# Patient Record
Sex: MALE | Race: WHITE | NOT HISPANIC OR LATINO
[De-identification: names, ages, dates, MRNs, and addresses within clinical notes are randomized per-mention and may not be internally consistent; named-entity substitution may affect disease eponyms.]

---

## 2017-03-31 ENCOUNTER — MEDICATION RENEWAL (OUTPATIENT)
Age: 57
End: 2017-03-31

## 2017-06-16 ENCOUNTER — MEDICATION RENEWAL (OUTPATIENT)
Age: 57
End: 2017-06-16

## 2017-09-08 ENCOUNTER — MEDICATION RENEWAL (OUTPATIENT)
Age: 57
End: 2017-09-08

## 2017-10-16 ENCOUNTER — OTHER (OUTPATIENT)
Age: 57
End: 2017-10-16

## 2017-10-16 DIAGNOSIS — Z23 ENCOUNTER FOR IMMUNIZATION: ICD-10-CM

## 2017-10-17 ENCOUNTER — MED ADMIN CHARGE (OUTPATIENT)
Age: 57
End: 2017-10-17

## 2017-10-17 ENCOUNTER — APPOINTMENT (OUTPATIENT)
Dept: INTERNAL MEDICINE | Facility: CLINIC | Age: 57
End: 2017-10-17
Payer: COMMERCIAL

## 2017-10-17 PROCEDURE — 90686 IIV4 VACC NO PRSV 0.5 ML IM: CPT

## 2017-10-17 PROCEDURE — G0008: CPT

## 2018-01-12 ENCOUNTER — MEDICATION RENEWAL (OUTPATIENT)
Age: 58
End: 2018-01-12

## 2018-02-08 ENCOUNTER — OTHER (OUTPATIENT)
Age: 58
End: 2018-02-08

## 2018-02-08 DIAGNOSIS — N39.0 URINARY TRACT INFECTION, SITE NOT SPECIFIED: ICD-10-CM

## 2018-02-08 DIAGNOSIS — R06.02 SHORTNESS OF BREATH: ICD-10-CM

## 2018-02-12 ENCOUNTER — MEDICATION RENEWAL (OUTPATIENT)
Age: 58
End: 2018-02-12

## 2018-02-12 RX ORDER — CEFUROXIME AXETIL 500 MG/1
500 TABLET ORAL
Qty: 20 | Refills: 0 | Status: COMPLETED | COMMUNITY
Start: 2018-02-12 | End: 2018-02-22

## 2018-02-12 RX ORDER — AZITHROMYCIN 250 MG/1
250 TABLET, FILM COATED ORAL
Qty: 1 | Refills: 0 | Status: COMPLETED | COMMUNITY
Start: 2018-02-08 | End: 2018-02-12

## 2018-04-12 ENCOUNTER — MEDICATION RENEWAL (OUTPATIENT)
Age: 58
End: 2018-04-12

## 2018-05-17 ENCOUNTER — RX RENEWAL (OUTPATIENT)
Age: 58
End: 2018-05-17

## 2018-07-31 ENCOUNTER — MEDICATION RENEWAL (OUTPATIENT)
Age: 58
End: 2018-07-31

## 2018-09-18 ENCOUNTER — MEDICATION RENEWAL (OUTPATIENT)
Age: 58
End: 2018-09-18

## 2018-11-05 ENCOUNTER — RX RENEWAL (OUTPATIENT)
Age: 58
End: 2018-11-05

## 2018-12-21 ENCOUNTER — RX RENEWAL (OUTPATIENT)
Age: 58
End: 2018-12-21

## 2019-06-06 ENCOUNTER — RX RENEWAL (OUTPATIENT)
Age: 59
End: 2019-06-06

## 2019-07-29 ENCOUNTER — APPOINTMENT (OUTPATIENT)
Dept: DERMATOLOGY | Facility: CLINIC | Age: 59
End: 2019-07-29
Payer: COMMERCIAL

## 2019-07-29 PROCEDURE — 99203 OFFICE O/P NEW LOW 30 MIN: CPT

## 2019-07-29 RX ORDER — PREDNISONE 20 MG/1
20 TABLET ORAL DAILY
Qty: 18 | Refills: 1 | Status: DISCONTINUED | COMMUNITY
Start: 2018-02-08 | End: 2019-07-29

## 2019-07-29 NOTE — ASSESSMENT
[FreeTextEntry1] : Seb derm\par DHS-Zinc vs. Tar shampoo's.\par Locoid solution for scalp prn.\par Hytone cream for facial rash prn.

## 2019-07-29 NOTE — PHYSICAL EXAM
[Oriented x 3] : ~L oriented x 3 [Alert] : alert [Well Nourished] : well nourished [Lips] : Lips [Ears] : Ears [FreeTextEntry3] : Erythema and scale - bilateral malar region, left medial canthal region, pre- and posterior auricular regions.  Scale of the posterior scalp. [Neck] : Neck

## 2019-07-29 NOTE — HISTORY OF PRESENT ILLNESS
[FreeTextEntry1] : Fit in for rash. [de-identified] : Centrofacial region, exacerbating/remitting over the past weeks or longer.  Irritated with lotions.  Marked flaking noted.

## 2019-10-28 ENCOUNTER — RX RENEWAL (OUTPATIENT)
Age: 59
End: 2019-10-28

## 2019-11-13 ENCOUNTER — APPOINTMENT (OUTPATIENT)
Dept: ORTHOPEDIC SURGERY | Facility: CLINIC | Age: 59
End: 2019-11-13
Payer: COMMERCIAL

## 2019-11-13 VITALS
BODY MASS INDEX: 30.46 KG/M2 | SYSTOLIC BLOOD PRESSURE: 142 MMHG | HEART RATE: 80 BPM | DIASTOLIC BLOOD PRESSURE: 84 MMHG | HEIGHT: 75 IN | WEIGHT: 245 LBS

## 2019-11-13 DIAGNOSIS — M79.671 PAIN IN RIGHT FOOT: ICD-10-CM

## 2019-11-13 PROCEDURE — 97760 ORTHOTIC MGMT&TRAING 1ST ENC: CPT

## 2019-11-13 PROCEDURE — 73630 X-RAY EXAM OF FOOT: CPT | Mod: RT

## 2019-11-13 PROCEDURE — 99204 OFFICE O/P NEW MOD 45 MIN: CPT | Mod: 25

## 2019-11-13 NOTE — CONSULT LETTER
[Consult Letter:] : I had the pleasure of evaluating your patient, [unfilled]. [Please see my note below.] : Please see my note below. [Sincerely,] : Sincerely, [Consult Closing:] : Thank you very much for allowing me to participate in the care of this patient.  If you have any questions, please do not hesitate to contact me. [FreeTextEntry3] : Vj Campuzano, DO\par Foot and Ankle Surgery\par

## 2019-11-13 NOTE — HISTORY OF PRESENT ILLNESS
[FreeTextEntry1] : 59 year old male presenting with right foot pain. The patient’s pain is noted to be a 7/10. The patient's pain began on 11/4/19. The patient cannot attribute their pain to any specific injury, fall, or trauma. The patient describes their pain as constant, throbbing, and sharp. The pain is made worse by walking. He is currently taking NSAIDs and Tylenol. No other complaints at this time.\par \par \par

## 2019-11-13 NOTE — DISCUSSION/SUMMARY
[de-identified] : Today I had a lengthy discussion with the patient regarding their right foot pain.I have addressed all the patient's concerns surrounding the pathology of their condition. I recommend that the patient utilize Voltaren gel topically. A prescription for the Voltaren gel was ordered for the patient today. If the Voltaren gel could not be obtained, Icy Hot, Biofreeze, or Bengay can be utilized instead. I also advised the patient to utilize ice and heat.  I recommend that the patient utilize a CAM boot. The patient was provided with a CAM boot in the office today. At this time I would like to obtain advanced imaging of the patient's right foot and right ankle. An MRI was ordered so I can find out more about the etiology of the patient's condition. The patient should follow up with the office after obtaining the MRIs. The patient understood and verbally agreed to the treatment plan. All of their questions were answered and they were satisfied with the visit. The patient should call the office if they have any questions or experience worsening symptoms.\par \par \par

## 2019-11-13 NOTE — PHYSICAL EXAM
[de-identified] : General: Alert and oriented x3. In no acute distress. Pleasant in nature with a normal affect. No apparent respiratory distress.\par \par R Foot Exam\par Skin: Clean, dry, intact\par Inspection: No obvious malalignment, no masses, no swelling, no effusion\par Pulses: 2+ DP/PT pulses\par ROM: FOOT Full ROM of digits, ANKLE 10 degrees of dorsiflexion, 40 degrees of plantarflexion, 10 degrees of subtalar motion.\par Painful ROM: None\par Tenderness: +plantar medial soft tissues. No pain on MT. No tenderness over the medial malleolus, No tenderness over the lateral malleolus, no CFL/ATFL/PTFL pain, no deltoid ligament pain. No heel pain. No Achilles tenderness. No 5th metatarsal pain. No pain to the LisFranc joint. No ttp over the posterior tibial tendon.\par Stability: Negative anterior/posterior drawer.\par Strength: 5/5 ADD/ABD/TA/GS/EHL/FHL/EDL\par Neuro: Sensation in tact to light touch throughout\par Additional tests: Negative Mortons test, negative tarsal tunnel tinels, negative single heel rise. \par \par BL Standing Exam\par +metatarsis adductus [de-identified] : 3V of the right foot were ordered obtained and reviewed by me today, 11/13/2019 , revealed: Metatarsis adductus, no fracture seen. \par \par \par

## 2019-11-13 NOTE — ADDENDUM
[FreeTextEntry1] : I, Ervin Hanh, acted solely as a scribe for Dr. Vj Campuzano on this date 11/13/2019 .\par All medical record entries made by the Scribe were at my, Dr. Vj Campuzano, direction and personally dictated by me on 11/13/2019 . I have reviewed the chart and agree that the record accurately reflects my personal performance of the history, physical exam, assessment and plan. I have also personally directed, reviewed, and agreed with the chart.\par \par \par

## 2019-11-14 DIAGNOSIS — M25.571 PAIN IN RIGHT ANKLE AND JOINTS OF RIGHT FOOT: ICD-10-CM

## 2019-11-15 ENCOUNTER — FORM ENCOUNTER (OUTPATIENT)
Age: 59
End: 2019-11-15

## 2019-11-16 ENCOUNTER — APPOINTMENT (OUTPATIENT)
Dept: MRI IMAGING | Facility: CLINIC | Age: 59
End: 2019-11-16
Payer: COMMERCIAL

## 2019-11-16 ENCOUNTER — OUTPATIENT (OUTPATIENT)
Dept: OUTPATIENT SERVICES | Facility: HOSPITAL | Age: 59
LOS: 1 days | End: 2019-11-16
Payer: COMMERCIAL

## 2019-11-16 DIAGNOSIS — M79.671 PAIN IN RIGHT FOOT: ICD-10-CM

## 2019-11-16 DIAGNOSIS — M25.571 PAIN IN RIGHT ANKLE AND JOINTS OF RIGHT FOOT: ICD-10-CM

## 2019-11-16 PROCEDURE — 73721 MRI JNT OF LWR EXTRE W/O DYE: CPT

## 2019-11-16 PROCEDURE — 73718 MRI LOWER EXTREMITY W/O DYE: CPT | Mod: 26,RT

## 2019-11-16 PROCEDURE — 73718 MRI LOWER EXTREMITY W/O DYE: CPT

## 2019-11-16 PROCEDURE — 73721 MRI JNT OF LWR EXTRE W/O DYE: CPT | Mod: 26,RT

## 2019-11-18 DIAGNOSIS — R68.82 DECREASED LIBIDO: ICD-10-CM

## 2020-01-07 PROBLEM — R68.82 DECREASED SEXUAL RESPONSE: Status: ACTIVE | Noted: 2020-01-07

## 2020-02-10 ENCOUNTER — MED ADMIN CHARGE (OUTPATIENT)
Age: 60
End: 2020-02-10

## 2020-04-09 DIAGNOSIS — M54.2 CERVICALGIA: ICD-10-CM

## 2020-04-26 ENCOUNTER — MESSAGE (OUTPATIENT)
Age: 60
End: 2020-04-26

## 2020-05-05 LAB
SARS-COV-2 IGG SERPL IA-ACNC: <0.1 INDEX
SARS-COV-2 IGG SERPL QL IA: NEGATIVE

## 2020-08-21 RX ORDER — HYDROCORTISONE BUTYRATE 1 MG/ML
0.1 SOLUTION TOPICAL
Qty: 1 | Refills: 2 | Status: COMPLETED | COMMUNITY
Start: 2019-07-29 | End: 2020-08-21

## 2020-08-21 RX ORDER — HYDROCORTISONE 25 MG/G
2.5 CREAM TOPICAL TWICE DAILY
Qty: 1 | Refills: 2 | Status: COMPLETED | COMMUNITY
Start: 2019-07-29 | End: 2020-08-21

## 2020-08-21 RX ORDER — PREDNISONE 20 MG/1
20 TABLET ORAL DAILY
Qty: 25 | Refills: 1 | Status: COMPLETED | COMMUNITY
Start: 2019-11-14 | End: 2020-08-21

## 2020-08-21 RX ORDER — PREDNISONE 20 MG/1
20 TABLET ORAL TWICE DAILY
Qty: 45 | Refills: 0 | Status: COMPLETED | COMMUNITY
Start: 2020-04-09 | End: 2020-08-21

## 2020-08-21 RX ORDER — ZONISAMIDE 100 MG/1
100 CAPSULE ORAL
Qty: 60 | Refills: 3 | Status: COMPLETED | COMMUNITY
Start: 2018-01-09 | End: 2020-08-21

## 2020-09-02 ENCOUNTER — EMERGENCY (EMERGENCY)
Facility: HOSPITAL | Age: 60
LOS: 0 days | Discharge: HOME | End: 2020-09-02
Payer: COMMERCIAL

## 2020-09-02 VITALS
HEART RATE: 57 BPM | SYSTOLIC BLOOD PRESSURE: 122 MMHG | TEMPERATURE: 98 F | OXYGEN SATURATION: 97 % | RESPIRATION RATE: 18 BRPM | DIASTOLIC BLOOD PRESSURE: 85 MMHG

## 2020-09-02 DIAGNOSIS — Z11.59 ENCOUNTER FOR SCREENING FOR OTHER VIRAL DISEASES: ICD-10-CM

## 2020-09-02 DIAGNOSIS — Z03.818 ENCOUNTER FOR OBSERVATION FOR SUSPECTED EXPOSURE TO OTHER BIOLOGICAL AGENTS RULED OUT: ICD-10-CM

## 2020-09-02 PROCEDURE — U0003: CPT

## 2020-09-02 PROCEDURE — 99283 EMERGENCY DEPT VISIT LOW MDM: CPT

## 2020-09-02 NOTE — ED STATDOCS - PATIENT PORTAL LINK FT
You can access the FollowMyHealth Patient Portal offered by [facility] by registering at the following website: [email or web address]. By joining [employer]’s FollowMyHealth portal, you will also be able to view your health information using other applications (apps) compatible with our system.

## 2020-09-02 NOTE — ED STATDOCS - PHYSICAL EXAMINATION
Constitutional: NAD AAOx3. Nontoxic, well appearing. Speaking full sentences  w/o distress  Eyes: EOMI, pupils equal  Head: Normocephalic atraumatic  Mouth: no airway obstruction  Cardiac: [id number]   Resp: Lungs CTAB  GI: Abd s/nt/nd  Neuro: motor and sensory intact

## 2020-09-03 LAB — SARS-COV-2 RNA SPEC QL NAA+PROBE: SIGNIFICANT CHANGE UP

## 2020-12-16 PROBLEM — N39.0 ACUTE UPPER URINARY TRACT INFECTION: Status: RESOLVED | Noted: 2018-02-08 | Resolved: 2020-12-16

## 2021-02-24 ENCOUNTER — OUTPATIENT (OUTPATIENT)
Dept: OUTPATIENT SERVICES | Facility: HOSPITAL | Age: 61
LOS: 1 days | End: 2021-02-24
Payer: COMMERCIAL

## 2021-02-24 DIAGNOSIS — Z20.828 CONTACT WITH AND (SUSPECTED) EXPOSURE TO OTHER VIRAL COMMUNICABLE DISEASES: ICD-10-CM

## 2021-02-24 LAB — SARS-COV-2 RNA SPEC QL NAA+PROBE: SIGNIFICANT CHANGE UP

## 2021-02-24 PROCEDURE — U0005: CPT

## 2021-02-24 PROCEDURE — C9803: CPT

## 2021-02-24 PROCEDURE — U0003: CPT

## 2021-02-25 DIAGNOSIS — Z20.828 CONTACT WITH AND (SUSPECTED) EXPOSURE TO OTHER VIRAL COMMUNICABLE DISEASES: ICD-10-CM

## 2021-04-23 RX ORDER — DICLOFENAC SODIUM 10 MG/G
1 GEL TOPICAL
Qty: 1 | Refills: 2 | Status: COMPLETED | COMMUNITY
Start: 2019-11-13 | End: 2021-04-23

## 2021-10-08 RX ORDER — ALPRAZOLAM 1 MG/1
1 TABLET ORAL
Qty: 90 | Refills: 0 | Status: ACTIVE | COMMUNITY
Start: 2017-03-31 | End: 1900-01-01

## 2021-10-16 ENCOUNTER — NON-APPOINTMENT (OUTPATIENT)
Age: 61
End: 2021-10-16

## 2021-10-20 ENCOUNTER — NON-APPOINTMENT (OUTPATIENT)
Age: 61
End: 2021-10-20

## 2021-10-20 DIAGNOSIS — R07.9 CHEST PAIN, UNSPECIFIED: ICD-10-CM

## 2021-10-21 LAB
25(OH)D3 SERPL-MCNC: 14.7 NG/ML
ALBUMIN SERPL ELPH-MCNC: 4.8 G/DL
ALP BLD-CCNC: 80 U/L
ALT SERPL-CCNC: 30 U/L
ANION GAP SERPL CALC-SCNC: 15 MMOL/L
APPEARANCE: ABNORMAL
AST SERPL-CCNC: 23 U/L
BACTERIA: NEGATIVE
BASOPHILS # BLD AUTO: 0.03 K/UL
BASOPHILS NFR BLD AUTO: 0.3 %
BILIRUB SERPL-MCNC: 0.8 MG/DL
BILIRUBIN URINE: NEGATIVE
BLOOD URINE: NEGATIVE
BUN SERPL-MCNC: 15 MG/DL
CALCIUM SERPL-MCNC: 9.8 MG/DL
CHLORIDE SERPL-SCNC: 106 MMOL/L
CHOLEST SERPL-MCNC: 98 MG/DL
CO2 SERPL-SCNC: 23 MMOL/L
COLOR: ABNORMAL
CREAT SERPL-MCNC: 0.8 MG/DL
EOSINOPHIL # BLD AUTO: 0.12 K/UL
EOSINOPHIL NFR BLD AUTO: 1.2 %
ESTIMATED AVERAGE GLUCOSE: 100 MG/DL
GLUCOSE QUALITATIVE U: NEGATIVE
GLUCOSE SERPL-MCNC: 140 MG/DL
HBA1C MFR BLD HPLC: 5.1 %
HCT VFR BLD CALC: 49.6 %
HDLC SERPL-MCNC: 39 MG/DL
HGB BLD-MCNC: 16.7 G/DL
HYALINE CASTS: 2 /LPF
IMM GRANULOCYTES NFR BLD AUTO: 0.3 %
KETONES URINE: NORMAL
LDLC SERPL CALC-MCNC: 36 MG/DL
LEUKOCYTE ESTERASE URINE: NEGATIVE
LYMPHOCYTES # BLD AUTO: 1.82 K/UL
LYMPHOCYTES NFR BLD AUTO: 18.6 %
MAN DIFF?: NORMAL
MCHC RBC-ENTMCNC: 31.9 PG
MCHC RBC-ENTMCNC: 33.7 GM/DL
MCV RBC AUTO: 94.7 FL
MICROSCOPIC-UA: NORMAL
MONOCYTES # BLD AUTO: 0.49 K/UL
MONOCYTES NFR BLD AUTO: 5 %
NEUTROPHILS # BLD AUTO: 7.28 K/UL
NEUTROPHILS NFR BLD AUTO: 74.6 %
NITRITE URINE: NEGATIVE
NONHDLC SERPL-MCNC: 59 MG/DL
PH URINE: 6
PLATELET # BLD AUTO: 256 K/UL
POTASSIUM SERPL-SCNC: 3.6 MMOL/L
PROT SERPL-MCNC: 7.5 G/DL
PROTEIN URINE: ABNORMAL
PSA SERPL-MCNC: 1.27 NG/ML
RBC # BLD: 5.24 M/UL
RBC # FLD: 12.9 %
RED BLOOD CELLS URINE: 3 /HPF
SODIUM SERPL-SCNC: 144 MMOL/L
SPECIFIC GRAVITY URINE: 1.04
SQUAMOUS EPITHELIAL CELLS: 0 /HPF
T3FREE SERPL-MCNC: 3.2 PG/ML
T4 FREE SERPL-MCNC: 1 NG/DL
TRIGL SERPL-MCNC: 115 MG/DL
TSH SERPL-ACNC: 1.21 UIU/ML
UROBILINOGEN URINE: ABNORMAL
WBC # FLD AUTO: 9.77 K/UL
WHITE BLOOD CELLS URINE: 1 /HPF

## 2021-10-22 ENCOUNTER — TRANSCRIPTION ENCOUNTER (OUTPATIENT)
Age: 61
End: 2021-10-22

## 2021-10-22 ENCOUNTER — INPATIENT (INPATIENT)
Facility: HOSPITAL | Age: 61
LOS: 0 days | Discharge: HOME | DRG: 251 | End: 2021-10-22
Attending: FAMILY MEDICINE | Admitting: INTERNAL MEDICINE
Payer: COMMERCIAL

## 2021-10-22 VITALS
RESPIRATION RATE: 19 BRPM | HEART RATE: 54 BPM | WEIGHT: 255.96 LBS | HEIGHT: 75 IN | DIASTOLIC BLOOD PRESSURE: 74 MMHG | SYSTOLIC BLOOD PRESSURE: 133 MMHG | OXYGEN SATURATION: 98 %

## 2021-10-22 VITALS
SYSTOLIC BLOOD PRESSURE: 118 MMHG | OXYGEN SATURATION: 100 % | DIASTOLIC BLOOD PRESSURE: 64 MMHG | HEART RATE: 62 BPM | RESPIRATION RATE: 16 BRPM

## 2021-10-22 DIAGNOSIS — Z86.59 PERSONAL HISTORY OF OTHER MENTAL AND BEHAVIORAL DISORDERS: ICD-10-CM

## 2021-10-22 DIAGNOSIS — I10 ESSENTIAL (PRIMARY) HYPERTENSION: ICD-10-CM

## 2021-10-22 DIAGNOSIS — R07.9 CHEST PAIN, UNSPECIFIED: ICD-10-CM

## 2021-10-22 DIAGNOSIS — Z95.5 PRESENCE OF CORONARY ANGIOPLASTY IMPLANT AND GRAFT: Chronic | ICD-10-CM

## 2021-10-22 DIAGNOSIS — I25.10 ATHEROSCLEROTIC HEART DISEASE OF NATIVE CORONARY ARTERY WITHOUT ANGINA PECTORIS: ICD-10-CM

## 2021-10-22 LAB
ALBUMIN SERPL ELPH-MCNC: 3.8 G/DL — SIGNIFICANT CHANGE UP (ref 3.3–5)
ALP SERPL-CCNC: 72 U/L — SIGNIFICANT CHANGE UP (ref 40–120)
ALT FLD-CCNC: 38 U/L — SIGNIFICANT CHANGE UP (ref 12–78)
ANION GAP SERPL CALC-SCNC: 7 MMOL/L — SIGNIFICANT CHANGE UP (ref 5–17)
APTT BLD: 30.9 SEC — SIGNIFICANT CHANGE UP (ref 27.5–35.5)
AST SERPL-CCNC: 22 U/L — SIGNIFICANT CHANGE UP (ref 15–37)
BASOPHILS # BLD AUTO: 0.03 K/UL — SIGNIFICANT CHANGE UP (ref 0–0.2)
BASOPHILS NFR BLD AUTO: 0.4 % — SIGNIFICANT CHANGE UP (ref 0–2)
BILIRUB SERPL-MCNC: 1 MG/DL — SIGNIFICANT CHANGE UP (ref 0.2–1.2)
BUN SERPL-MCNC: 14 MG/DL — SIGNIFICANT CHANGE UP (ref 7–23)
CALCIUM SERPL-MCNC: 8.8 MG/DL — SIGNIFICANT CHANGE UP (ref 8.5–10.1)
CHLORIDE SERPL-SCNC: 109 MMOL/L — HIGH (ref 96–108)
CO2 SERPL-SCNC: 25 MMOL/L — SIGNIFICANT CHANGE UP (ref 22–31)
CREAT SERPL-MCNC: 0.76 MG/DL — SIGNIFICANT CHANGE UP (ref 0.5–1.3)
EOSINOPHIL # BLD AUTO: 0.08 K/UL — SIGNIFICANT CHANGE UP (ref 0–0.5)
EOSINOPHIL NFR BLD AUTO: 1.2 % — SIGNIFICANT CHANGE UP (ref 0–6)
GLUCOSE SERPL-MCNC: 92 MG/DL — SIGNIFICANT CHANGE UP (ref 70–99)
HCT VFR BLD CALC: 44.3 % — SIGNIFICANT CHANGE UP (ref 39–50)
HGB BLD-MCNC: 15.5 G/DL — SIGNIFICANT CHANGE UP (ref 13–17)
IMM GRANULOCYTES NFR BLD AUTO: 0.1 % — SIGNIFICANT CHANGE UP (ref 0–1.5)
INR BLD: 1.15 RATIO — SIGNIFICANT CHANGE UP (ref 0.88–1.16)
LIDOCAIN IGE QN: 121 U/L — SIGNIFICANT CHANGE UP (ref 73–393)
LYMPHOCYTES # BLD AUTO: 2.28 K/UL — SIGNIFICANT CHANGE UP (ref 1–3.3)
LYMPHOCYTES # BLD AUTO: 33.8 % — SIGNIFICANT CHANGE UP (ref 13–44)
MAGNESIUM SERPL-MCNC: 2.3 MG/DL — SIGNIFICANT CHANGE UP (ref 1.6–2.6)
MCHC RBC-ENTMCNC: 32.4 PG — SIGNIFICANT CHANGE UP (ref 27–34)
MCHC RBC-ENTMCNC: 35 GM/DL — SIGNIFICANT CHANGE UP (ref 32–36)
MCV RBC AUTO: 92.5 FL — SIGNIFICANT CHANGE UP (ref 80–100)
MONOCYTES # BLD AUTO: 0.42 K/UL — SIGNIFICANT CHANGE UP (ref 0–0.9)
MONOCYTES NFR BLD AUTO: 6.2 % — SIGNIFICANT CHANGE UP (ref 2–14)
NEUTROPHILS # BLD AUTO: 3.92 K/UL — SIGNIFICANT CHANGE UP (ref 1.8–7.4)
NEUTROPHILS NFR BLD AUTO: 58.3 % — SIGNIFICANT CHANGE UP (ref 43–77)
NT-PROBNP SERPL-SCNC: 30 PG/ML — SIGNIFICANT CHANGE UP (ref 0–125)
PLATELET # BLD AUTO: 204 K/UL — SIGNIFICANT CHANGE UP (ref 150–400)
POTASSIUM SERPL-MCNC: 3.5 MMOL/L — SIGNIFICANT CHANGE UP (ref 3.5–5.3)
POTASSIUM SERPL-SCNC: 3.5 MMOL/L — SIGNIFICANT CHANGE UP (ref 3.5–5.3)
PROT SERPL-MCNC: 7 GM/DL — SIGNIFICANT CHANGE UP (ref 6–8.3)
PROTHROM AB SERPL-ACNC: 13.2 SEC — SIGNIFICANT CHANGE UP (ref 10.6–13.6)
RBC # BLD: 4.79 M/UL — SIGNIFICANT CHANGE UP (ref 4.2–5.8)
RBC # FLD: 12.7 % — SIGNIFICANT CHANGE UP (ref 10.3–14.5)
SARS-COV-2 RNA SPEC QL NAA+PROBE: SIGNIFICANT CHANGE UP
SODIUM SERPL-SCNC: 141 MMOL/L — SIGNIFICANT CHANGE UP (ref 135–145)
TROPONIN I, HIGH SENSITIVITY RESULT: 5.33 NG/L — SIGNIFICANT CHANGE UP
WBC # BLD: 6.74 K/UL — SIGNIFICANT CHANGE UP (ref 3.8–10.5)
WBC # FLD AUTO: 6.74 K/UL — SIGNIFICANT CHANGE UP (ref 3.8–10.5)

## 2021-10-22 PROCEDURE — 99285 EMERGENCY DEPT VISIT HI MDM: CPT

## 2021-10-22 PROCEDURE — 86900 BLOOD TYPING SEROLOGIC ABO: CPT

## 2021-10-22 PROCEDURE — 71045 X-RAY EXAM CHEST 1 VIEW: CPT | Mod: 26

## 2021-10-22 PROCEDURE — 86850 RBC ANTIBODY SCREEN: CPT

## 2021-10-22 PROCEDURE — 93010 ELECTROCARDIOGRAM REPORT: CPT | Mod: 76

## 2021-10-22 PROCEDURE — 36415 COLL VENOUS BLD VENIPUNCTURE: CPT

## 2021-10-22 PROCEDURE — 86901 BLOOD TYPING SEROLOGIC RH(D): CPT

## 2021-10-22 PROCEDURE — 99236 HOSP IP/OBS SAME DATE HI 85: CPT

## 2021-10-22 PROCEDURE — 93005 ELECTROCARDIOGRAM TRACING: CPT

## 2021-10-22 RX ORDER — SODIUM CHLORIDE 9 MG/ML
300 INJECTION INTRAMUSCULAR; INTRAVENOUS; SUBCUTANEOUS ONCE
Refills: 0 | Status: DISCONTINUED | OUTPATIENT
Start: 2021-10-22 | End: 2021-10-22

## 2021-10-22 RX ORDER — ESCITALOPRAM OXALATE 10 MG/1
20 TABLET, FILM COATED ORAL DAILY
Refills: 0 | Status: DISCONTINUED | OUTPATIENT
Start: 2021-10-22 | End: 2021-10-22

## 2021-10-22 RX ORDER — LISINOPRIL 2.5 MG/1
10 TABLET ORAL DAILY
Refills: 0 | Status: DISCONTINUED | OUTPATIENT
Start: 2021-10-22 | End: 2021-10-22

## 2021-10-22 RX ORDER — ASPIRIN/CALCIUM CARB/MAGNESIUM 324 MG
1 TABLET ORAL
Qty: 0 | Refills: 0 | DISCHARGE

## 2021-10-22 RX ORDER — NIACIN 50 MG
750 TABLET ORAL AT BEDTIME
Refills: 0 | Status: DISCONTINUED | OUTPATIENT
Start: 2021-10-22 | End: 2021-10-22

## 2021-10-22 RX ORDER — ASPIRIN/CALCIUM CARB/MAGNESIUM 324 MG
1 TABLET ORAL
Qty: 0 | Refills: 0 | DISCHARGE
Start: 2021-10-22

## 2021-10-22 RX ORDER — ONDANSETRON 8 MG/1
4 TABLET, FILM COATED ORAL EVERY 8 HOURS
Refills: 0 | Status: DISCONTINUED | OUTPATIENT
Start: 2021-10-22 | End: 2021-10-22

## 2021-10-22 RX ORDER — CLOPIDOGREL BISULFATE 75 MG/1
75 TABLET, FILM COATED ORAL DAILY
Refills: 0 | Status: DISCONTINUED | OUTPATIENT
Start: 2021-10-23 | End: 2021-10-22

## 2021-10-22 RX ORDER — LANOLIN ALCOHOL/MO/W.PET/CERES
3 CREAM (GRAM) TOPICAL AT BEDTIME
Refills: 0 | Status: DISCONTINUED | OUTPATIENT
Start: 2021-10-22 | End: 2021-10-22

## 2021-10-22 RX ORDER — METOPROLOL TARTRATE 50 MG
50 TABLET ORAL DAILY
Refills: 0 | Status: DISCONTINUED | OUTPATIENT
Start: 2021-10-22 | End: 2021-10-22

## 2021-10-22 RX ORDER — ACETAMINOPHEN 500 MG
650 TABLET ORAL EVERY 6 HOURS
Refills: 0 | Status: DISCONTINUED | OUTPATIENT
Start: 2021-10-22 | End: 2021-10-22

## 2021-10-22 RX ORDER — ATORVASTATIN CALCIUM 80 MG/1
80 TABLET, FILM COATED ORAL AT BEDTIME
Refills: 0 | Status: DISCONTINUED | OUTPATIENT
Start: 2021-10-22 | End: 2021-10-22

## 2021-10-22 RX ORDER — ASPIRIN/CALCIUM CARB/MAGNESIUM 324 MG
325 TABLET ORAL DAILY
Refills: 0 | Status: DISCONTINUED | OUTPATIENT
Start: 2021-10-22 | End: 2021-10-22

## 2021-10-22 RX ORDER — DEXTROSE MONOHYDRATE, SODIUM CHLORIDE, AND POTASSIUM CHLORIDE 50; .745; 4.5 G/1000ML; G/1000ML; G/1000ML
1000 INJECTION, SOLUTION INTRAVENOUS
Refills: 0 | Status: DISCONTINUED | OUTPATIENT
Start: 2021-10-22 | End: 2021-10-22

## 2021-10-22 NOTE — ED PROVIDER NOTE - NSICDXPASTMEDICALHX_GEN_ALL_CORE_FT
PAST MEDICAL HISTORY:  CAD (coronary artery disease)     H/O: depression     HTN (hypertension)

## 2021-10-22 NOTE — H&P ADULT - NSHPLABSRESULTS_GEN_ALL_CORE
· EKG Date/Time: 22-Oct-2021 05:25  · Rate: 58  · Interpretation: abnormal  · Abnormal Rhythm: sinus bradycardia  · Axis: Normal  · Prior EKG Status: the EKG is unchanged from prior EKG  May 2013    10/22/21  CXR-NAD     LABS: All Labs Reviewed:                        15.5   6.74  )-----------( 204      ( 22 Oct 2021 05:49 )             44.3     10-22    141  |  109<H>  |  14  ----------------------------<  92  3.5   |  25  |  0.76    Ca    8.8      22 Oct 2021 05:49  Mg     2.3     10-22    TPro  7.0  /  Alb  3.8  /  TBili  1.0  /  DBili  x   /  AST  22  /  ALT  38  /  AlkPhos  72  10-22    PT/INR - ( 22 Oct 2021 05:49 )   PT: 13.2 sec;   INR: 1.15 ratio         PTT - ( 22 Oct 2021 05:49 )  PTT:30.9 sec  Troponin I, High Sensitivity (10.22.21 @ 05:49)    Troponin I, High Sensitivity Result: 5.33:     COVID-19 PCR: NotDetec (22 Oct 2021 05:49)

## 2021-10-22 NOTE — DISCHARGE NOTE PROVIDER - NSDCCPCAREPLAN_GEN_ALL_CORE_FT
PRINCIPAL DISCHARGE DIAGNOSIS  Diagnosis: Chest pain  Assessment and Plan of Treatment: s/p [facility]   Balloon angiplasty to LAD for instent restenosis   Continue all medications including ASA, Plavix  DO NOT STOP taking these medicines, they keep your stent open without first asking your Cardiologist.   Follow up with Dr. Chavis in 1-2 week      You may shower, no baths/swimming x one week. No heavy lifting greater than 5-10 pounds with right wrist x one week. No strenuous activity x 3 weeks. Monitor site of procedure and notify your doctor for any redness, swelling, discharge  Bandage: Keep bandage clean and dry. Remove after 24 hours.   Special Instructions: procedure was done in your wrist, avoid reaching or placing too much pressure on that arm or wrist for 48 hours.  If you have bleeding from the procedure site: Lie down and remove the bandage. Apply hard pressure and call your doctor. If bleeding and swelling cannot be controlled, call 911.   Call your doctor immediately if: 1) You have severe pain, swelling, or bruising at the procedure site. A small amount or soreness and bruising (black and blue) is normal. 2) Procedure site becomes very red or feels hot to touch. 3) Your hand becomes blue or feels cold to touch. 4)You feel sudden back or belly pain. 5)You develop fever      SECONDARY DISCHARGE DIAGNOSES  Diagnosis: HTN (hypertension)  Assessment and Plan of Treatment: Continue current blood pressure medication regimen as directed. Monitor for any visual changes, headaches or dizziness.  Monitor blood pressure regularly.  Follow up with your PCP for further management for high blood pressure, please call to make appointment within 1 week of discharge    Diagnosis: H/O: depression  Assessment and Plan of Treatment: Continue medications. Follow up with your PCP for further evaluation and management. Please call to make an appointment within 1-2 weeks of discharge.    Diagnosis: CAD (coronary artery disease)  Assessment and Plan of Treatment: Please continue your medications as directed and follow-up with your primary provider/cardiologist to further manage your care. Monitor for signs/symptoms of uncontrolled atrial fibrillation, such as, increased heart rate, palpitations, chest pain, dizziness, or shortness of breath - Return to emergency room if these signs/symptoms are present.

## 2021-10-22 NOTE — DISCHARGE NOTE PROVIDER - NSDCCPTREATMENT_GEN_ALL_CORE_FT
PRINCIPAL PROCEDURE  Procedure: Left heart catheterization  Findings and Treatment: s/p POBA to LAD ISR

## 2021-10-22 NOTE — H&P ADULT - HISTORY OF PRESENT ILLNESS
60 yo M with PMHx of CAD,  MI at 48 multiple coronary stents, HTN, depression presents with chest pain.  Chest tigtngess a/w SOB/HENDRIX x 2 days. Patient reporting outpatient cardiac up completed, with inconclusive stress test. Patient deferred elective LHC as outpatient. Pain occurred at rest this morning, was in center of chest and is described as squeezing pain for about 10-15 minutes, concerning patient so he came to ED.  Given outpatient work up in office with Dr. Chavis, plan for LHC with Dr. Hensley today.

## 2021-10-22 NOTE — ED ADULT NURSE NOTE - OBJECTIVE STATEMENT
Pt presents to ED for chest pain that lasted 10 minutes that started 1 hour PTA. Pt states that he was also sweating during that episode. Pt states that he spoke to cardiologist Dr. Chavis and was advised to come to the ED. Pt has a history of an MI and 12 stents. pt states he also had chest pain 2 days ago that felt similar to that episode of chest pain earlier today. Pt denies chest pain, abdominal pain, LOC, dizziness, nausea/vomiting at this time.

## 2021-10-22 NOTE — DISCHARGE NOTE PROVIDER - CARE PROVIDER_API CALL
Ezequiel Shields)  Internal Medicine; Pulmonary Disease  [address], [address]  [place] [address]  Phone: [phone]  Fax: [phone]  Established Patient  Follow Up Time: 2 weeks    Demetrius Chavis)  Cardiovascular Disease; Nuclear Cardiology  [address] [place] [address]  Phone: [phone]  Fax: [phone]  Established Patient  Follow Up Time: 1 week    Chet Camara)  Cardiovascular Disease; Interventional Cardiology  [address] [place] [address]  Phone: [phone]  Fax: [phone]  Follow Up Time: 2 weeks

## 2021-10-22 NOTE — ED PROVIDER NOTE - CLINICAL SUMMARY MEDICAL DECISION MAKING FREE TEXT BOX
Hx of CAD with stents. EKG unchanged.  Cardiac labs to be sent.  Patient moderate to high risk.  Will consult Dr. Chavis.

## 2021-10-22 NOTE — PROGRESS NOTE ADULT - SUBJECTIVE AND OBJECTIVE BOX
Cath Lab Nurse Practitioner Note  HPI:62 yo M with PMHx of CAD,  MI at 48 multiple coronary stents, HTN, depression presents with chest pain.  Chest tigtngess a/w SOB/HENDRIX x 2 days. Patient reporting outpatient cardiac up completed, with inconclusive stress test. Patient deferred elective LHC as outpatient. Pain occurred at rest this morning, was in center of chest and is described as squeezing pain for about 10-15 minutes, concerning patient so he came to ED.  Given outpatient work up in office with Dr. Chavis, plan for LHC with Dr. Hensley today.  (22 Oct 2021 08:30)    s/p LHC : POBA to LAD  Pt denies chest pain/SOB/palpitations post cath.      Vital Signs Last 24 Hrs  T(F): 98.3 HR: 60   BP: 109/71  RR: 16 SpO2: 97%       Labs:                        15.5   6.74  )-----------( 204      ( 22 Oct 2021 05:49 )             44.3     10-22    141  |  109<H>  |  14  ----------------------------<  92  3.5   |  25  |  0.76    Ca    8.8      22 Oct 2021 05:49  Mg     2.3     10-22    TPro  7.0  /  Alb  3.8  /  TBili  1.0  /  DBili  x   /  AST  22  /  ALT  38  /  AlkPhos  72  10-22    PT/INR - ( 22 Oct 2021 05:49 )   PT: 13.2 sec;   INR: 1.15 ratio         PTT - ( 22 Oct 2021 05:49 )  PTT:30.9 sec        MEDICATIONS  (STANDING):  aspirin 325 milliGRAM(s) Oral daily  atorvastatin 80 milliGRAM(s) Oral at bedtime  escitalopram 20 milliGRAM(s) Oral daily  lisinopril 10 milliGRAM(s) Oral daily  metoprolol succinate ER 50 milliGRAM(s) Oral daily  niacin  milliGRAM(s) Oral at bedtime  sodium chloride 0.45% with potassium chloride 20 mEq/L 1000 milliLiter(s) (75 mL/Hr) IV Continuous <Continuous>      PHYSICAL EXAM  GENERAL: NAD, AAOx3  CHEST/LUNG: Clear to auscultation bilaterally; No wheeze  HEART: s1 s2 Regular rate and rhythm; No murmurs, rubs, or gallops  ABDOMEN: Soft, Nontender, Nondistended; Bowel sounds present X 4 quadrants   EXTREMITIES:  2+ Peripheral Pulses, No clubbing, cyanosis, or edema  Psych: normal affect and behavior, calm and cooperative   PROCEDURE SITE: LRA accessed, hemoband to be removed at 1215pm ,Site is without hematoma or bleeding. Sensation and THAD intact. Distal pulses palpable 2+, capillary refill < 2 seconds. Patient denies pain, numbness, tingling, CP or SOB. Clean dry dressing applied     EKG post POBA- SR rate 60    PROCEDURE RESULTS full report to follow     ASSESSMENT : 62 yo M with PMHx of CAD,  MI at 48 multiple coronary stents, HTN, depression presents with chest pain.    s/p LHC revealing ISR to LAD s/p POBA will go for brachytherapy with Dr. Huston at [facility] as an outpatient    PLAN:    #CAD  - s/p POBA to LAD  -IV hydration NS @ 75mL/hr x 6 hours  -VS, lab, diet and activity per PCI post orders  -continue ASA/plavix/b-blocker/statin  -Discussed therapeutic lifestyle changes to reduce risk factors such as following a cardiac diet, weight loss, maintaining a healthy weight, exercise, smoking cessation, medication compliance, and regular follow-up  with PCP/Cardioloigst  --Post cath instructions reviewed, patient verbalizes and understands instructions  - Patient to be discharged home at 5pm, recommend following up with cardiologist in 1-2 weeks  -plan discussed with patient, RN and Dr. Hensley, and Dr. Scott

## 2021-10-22 NOTE — H&P ADULT - PROBLEM SELECTOR PLAN 1
[facility] today   - rest of plan post procedure   c/w ASA, plavix, BB, Statin

## 2021-10-22 NOTE — DISCHARGE NOTE PROVIDER - PROVIDER TOKENS
PROVIDER:[TOKEN:[[id number]],FOLLOWUP:[2 weeks],ESTABLISHEDPATIENT:[T]],PROVIDER:[TOKEN:[[id number]],FOLLOWUP:[1 week],ESTABLISHEDPATIENT:[T]],PROVIDER:[TOKEN:[[id number]],FOLLOWUP:[2 weeks]]

## 2021-10-22 NOTE — H&P ADULT - NSICDXPASTMEDICALHX_GEN_ALL_CORE_FT
PAST MEDICAL HISTORY:  CAD (coronary artery disease)     H/O: depression     HTN (hypertension)     STEMI (ST elevation myocardial infarction) at 47 y/o

## 2021-10-22 NOTE — ED PROVIDER NOTE - CARDIAC, MLM
Normal rate, regular rhythm.  Heart sounds S1, S2.  No murmurs, rubs or gallops.  No reproducible chest wall tenderness

## 2021-10-22 NOTE — PACU DISCHARGE NOTE - COMMENTS
S/p LHC via left radial artery. VS stable. Discharge instructions reviewed and patient verbalizes understanding.  Patient pending transport home at this time

## 2021-10-22 NOTE — H&P ADULT - NSHPPHYSICALEXAM_GEN_ALL_CORE
Vital Signs Last 24 Hrs  T(C): 36.8 (22 Oct 2021 07:31), Max: 36.8 (22 Oct 2021 07:26)  T(F): 98.3 (22 Oct 2021 07:31), Max: 98.3 (22 Oct 2021 07:26)  HR: 60 (22 Oct 2021 07:31) (54 - 60)  BP: 120/75 (22 Oct 2021 07:31) (103/74 - 133/74)  RR: 16 (22 Oct 2021 07:31) (16 - 19)  SpO2: 98% (22 Oct 2021 07:31) (98% - 99%)

## 2021-10-22 NOTE — ED ADULT NURSE NOTE - NSIMPLEMENTINTERV_GEN_ALL_ED
Implemented All Fall with Harm Risk Interventions:  [place] to call system. Call bell, personal items and telephone within reach. Instruct patient to call for assistance. Room bathroom lighting operational. Non-slip footwear when patient is off stretcher. Physically safe environment: no spills, clutter or unnecessary equipment. Stretcher in lowest position, wheels locked, appropriate side rails in place. Provide visual cue, wrist band, yellow gown, etc. Monitor gait and stability. Monitor for mental status changes and reorient to person, place, and time. Review medications for side effects contributing to fall risk. Reinforce activity limits and safety measures with patient and family. Provide visual clues: red socks.

## 2021-10-22 NOTE — CHART NOTE - NSCHARTNOTEFT_GEN_A_CORE
Cardiac Cath Lab Nurse Practitioner :  HPI: 60 yo M with PMHx of CAD,  MI at 48 multiple coronary stents, HTN, depression presents with chest pain.  Chest tigtngess a/w SOB/HENDRIX x 2 days. Patient reporting outpatient cardiac up completed, with inconclusive stress test. Patient deferred elective LHC as outpatient. Pain occurred at rest this morning, was in center of chest and is described as squeezing pain for about 10-15 minutes, concerning patient so he came to ED.  Given outpatient work up in office with Dr. Chavis, plan for LHC with Dr. Hensley today.       Patient seen and examined. No c/o symptoms other than identified in ROS    MEDICATIONS  (STANDING):      Vital Signs Last 24 Hrs  T(C): 36.8 (22 Oct 2021 07:31), Max: 36.8 (22 Oct 2021 07:26)  T(F): 98.3 (22 Oct 2021 07:31), Max: 98.3 (22 Oct 2021 07:26)  HR: 60 (22 Oct 2021 07:31) (54 - 60)  BP: 120/75 (22 Oct 2021 07:31) (103/74 - 133/74)  BP(mean): --  RR: 16 (22 Oct 2021 07:31) (16 - 19)  SpO2: 98% (22 Oct 2021 07:31) (98% - 99%)    LABS:                        15.5   6.74  )-----------( 204      ( 22 Oct 2021 05:49 )             44.3     10-22    141  |  109<H>  |  14  ----------------------------<  92  3.5   |  25  |  0.76    Ca    8.8      22 Oct 2021 05:49  Mg     2.3     10-22    TPro  7.0  /  Alb  3.8  /  TBili  1.0  /  DBili  x   /  AST  22  /  ALT  38  /  AlkPhos  72  10-22        PT/INR - ( 22 Oct 2021 05:49 )   PT: 13.2 sec;   INR: 1.15 ratio         PTT - ( 22 Oct 2021 05:49 )  PTT:30.9 sec    PHYSICAL EXAM  GENERAL: NAD, AAOx3  CHEST/LUNG: Clear to auscultation bilaterally; No wheeze  HEART: s1 s2 Regular rate and rhythm; No murmurs, rubs, or gallops  ABDOMEN: Soft, Nontender, Nondistended; Bowel sounds present X 4 quadrants   EXTREMITIES:  2+ Peripheral Pulses, No clubbing, cyanosis, or edema  SKIN: No rashes or lesions,  b/l LE not red, cool to touch,  no open skin no drainage  NEURO: nonfocal CN/motor/sensory/reflexes  Psych: normal affect and behavior, calm and cooperative     PLAN:  -plan for LHC with possible stent placement   -Consent obtained for cardiac catheterization w/ coronary angiogram and possible stent placement. Pt is competent, has capacity, and understands risks and benefits of procedure. Risks and benefits discussed. Risk discussed included, but not limited to MI, stroke, mortality, major bleeding, arrythmia, or infection. All questions answered     ASA class: II  Creatinine: 0.76  GFR: 99  Bleeding  Risk score: 2.4%

## 2021-10-22 NOTE — ED PROVIDER NOTE - OBJECTIVE STATEMENT
Pt. is a 62 yo M with PMHx of CAD, multiple coronary stents, HTN, depression presents with chest pain.  Pain occurred at rest this morning, was in center of chest and is described as squeezing pain for about 10-15 minutes.  Pain was associated with diaphoresis and shortness of breath.  Patient took his meds this morning including aspirin and plavix.  Pain now gone.  Episode occurred 2 days ago while working.  Pain occurred at rest and was also a substernal squeezing pain. Patient called his cardiologist who told him to go to the ER. Patient is Covid vaccinated.  No travel or recent illness. Cards: Partha

## 2021-10-22 NOTE — ED PROVIDER NOTE - PROGRESS NOTE DETAILS
CXR: NAD pt signed out to me pending cardiology and 2nd trop. Dr. Hensley called me stating that he spoke with Dr. Rich and the plan is to do an angiogram. I spoke with patient and updated him on this plan. Left a message on the hospitalist phone. Ryan Kemp M.D., Attending Physician

## 2021-10-22 NOTE — ED ADULT TRIAGE NOTE - CHIEF COMPLAINT QUOTE
episode of dull squeezing chest pain lasting 10 minutes one hour prior to arrival. also reports similar episode 2 days ago lasting 15 minutes. spoke to cardiologist dr garcia, advised patient to come to ED. history of 12 stents.

## 2021-10-22 NOTE — DISCHARGE NOTE PROVIDER - CARE PROVIDERS DIRECT ADDRESSES
[email or web address]

## 2021-10-22 NOTE — DISCHARGE NOTE PROVIDER - HOSPITAL COURSE
60 yo M with PMHx of CAD,  MI at 48 multiple coronary stents, HTN, depression presents with chest pain.  Chest tigtngess a/w SOB/HENDRIX x 2 days. Patient reporting outpatient cardiac up completed, with inconclusive stress test. Patient deferred elective LHC as outpatient. Pain occurred at rest this morning, was in center of chest and is described as squeezing pain for about 10-15 minutes, concerning patient so he came to ED.  s/p LHC revealing ISR to LAD s/p POBA will go for brachytherapy with Dr. Huston at [facility] as an outpatient    #CAD  - s/p POBA to LAD  -continue ASA/plavix/b-blocker/statin  -Discussed therapeutic lifestyle changes to reduce risk factors such as following a cardiac diet, weight loss, maintaining a healthy weight, exercise, smoking cessation, medication compliance, and regular follow-up  with PCP/Cardioloigst  --Post cath instructions reviewed, patient verbalizes and understands instructions  - Patient to be discharged home at 5pm, recommend following up with cardiologist in 1-2 weeks  -plan discussed with patient, RN and Dr. Hensley, and Dr. Scott     60 yo M with PMHx of CAD,  MI at 48 multiple coronary stents, HTN, depression presents with chest pain.  Chest tigtngess a/w SOB/HENDRIX x 2 days. Patient reporting outpatient cardiac up completed, with inconclusive stress test. Patient deferred elective LHC as outpatient. Pain occurred at rest this morning, was in center of chest and is described as squeezing pain for about 10-15 minutes, concerning patient so he came to ED.  s/p LHC revealing ISR to LAD s/p POBA will go for brachytherapy with Dr. Huston at [facility] as an outpatient    #CAD  - s/p POBA to LAD  -continue ASA/plavix/b-blocker/statin  -Discussed therapeutic lifestyle changes to reduce risk factors such as following a cardiac diet, weight loss, maintaining a healthy weight, exercise, smoking cessation, medication compliance, and regular follow-up  with PCP/Cardioloigst  --Post cath instructions reviewed, patient verbalizes and understands instructions  - Patient to be discharged home at 5pm, recommend following up with cardiologist in 1-2 weeks  -plan discussed with patient, RN and Dr. Hensley, and Dr. Scott    Pt seen and examined with NP. A&P reviewed.   Medically stable for DC and f/u with cardiology as outpt.  Time spent 50 min.

## 2021-10-22 NOTE — DISCHARGE NOTE PROVIDER - NSDCMRMEDTOKEN_GEN_ALL_CORE_FT
Altace 2.5 mg oral capsule: 1 cap(s) orally once a day  aspirin 325 mg oral tablet: 1 tab(s) orally once a day  Lexapro 20 mg oral tablet: 1 tab(s) orally once a day  Lipitor 80 mg oral tablet: 1 tab(s) orally once a day  metoprolol succinate 50 mg oral tablet, extended release: 1 tab(s) orally once a day  Niaspan  mg oral tablet, extended release: 2 tab(s) orally once a day (at bedtime)  Plavix 75 mg oral tablet: 1 tab(s) orally once a day

## 2021-11-03 ENCOUNTER — APPOINTMENT (OUTPATIENT)
Dept: INTERNAL MEDICINE | Facility: CLINIC | Age: 61
End: 2021-11-03
Payer: COMMERCIAL

## 2021-11-03 PROBLEM — I25.10 ATHEROSCLEROTIC HEART DISEASE OF NATIVE CORONARY ARTERY WITHOUT ANGINA PECTORIS: Chronic | Status: ACTIVE | Noted: 2021-10-22

## 2021-11-03 PROBLEM — Z86.59 PERSONAL HISTORY OF OTHER MENTAL AND BEHAVIORAL DISORDERS: Chronic | Status: ACTIVE | Noted: 2021-10-22

## 2021-11-03 PROBLEM — I21.3 ST ELEVATION (STEMI) MYOCARDIAL INFARCTION OF UNSPECIFIED SITE: Chronic | Status: ACTIVE | Noted: 2021-10-22

## 2021-11-03 PROBLEM — I10 ESSENTIAL (PRIMARY) HYPERTENSION: Chronic | Status: ACTIVE | Noted: 2021-10-22

## 2021-11-03 PROCEDURE — 0013A: CPT

## 2021-11-04 DIAGNOSIS — T82.855A STENOSIS OF CORONARY ARTERY STENT, INITIAL ENCOUNTER: ICD-10-CM
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
## 2021-11-04 DIAGNOSIS — I25.2 OLD MYOCARDIAL INFARCTION: ICD-10-CM
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
## 2021-11-04 DIAGNOSIS — I25.110 ATHEROSCLEROTIC HEART DISEASE OF NATIVE CORONARY ARTERY WITH UNSTABLE ANGINA PECTORIS: ICD-10-CM

## 2021-11-04 DIAGNOSIS — Z95.5 PRESENCE OF CORONARY ANGIOPLASTY IMPLANT AND GRAFT: ICD-10-CM

## 2021-11-04 DIAGNOSIS — Z79.02 LONG TERM (CURRENT) USE OF ANTITHROMBOTICS/ANTIPLATELETS: ICD-10-CM
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
## 2021-11-04 DIAGNOSIS — I10 ESSENTIAL (PRIMARY) HYPERTENSION: ICD-10-CM

## 2021-11-04 DIAGNOSIS — R00.1 BRADYCARDIA, UNSPECIFIED: ICD-10-CM

## 2021-11-04 DIAGNOSIS — Z79.82 LONG TERM (CURRENT) USE OF ASPIRIN: ICD-10-CM

## 2021-11-04 DIAGNOSIS — F32.A DEPRESSION, UNSPECIFIED: ICD-10-CM

## 2021-11-04 DIAGNOSIS — Z82.49 FAMILY HISTORY OF ISCHEMIC HEART DISEASE AND OTHER DISEASES OF THE CIRCULATORY SYSTEM: ICD-10-CM

## 2022-01-05 ENCOUNTER — NON-APPOINTMENT (OUTPATIENT)
Age: 62
End: 2022-01-05

## 2022-01-05 ENCOUNTER — EMERGENCY (EMERGENCY)
Facility: HOSPITAL | Age: 62
LOS: 0 days | Discharge: HOME | End: 2022-01-05
Attending: STUDENT IN AN ORGANIZED HEALTH CARE EDUCATION/TRAINING PROGRAM
Payer: SELF-PAY

## 2022-01-05 VITALS
DIASTOLIC BLOOD PRESSURE: 70 MMHG | WEIGHT: 250 LBS | RESPIRATION RATE: 18 BRPM | HEART RATE: 73 BPM | HEIGHT: 75 IN | SYSTOLIC BLOOD PRESSURE: 146 MMHG | OXYGEN SATURATION: 100 %

## 2022-01-05 VITALS
HEART RATE: 70 BPM | DIASTOLIC BLOOD PRESSURE: 78 MMHG | SYSTOLIC BLOOD PRESSURE: 125 MMHG | RESPIRATION RATE: 18 BRPM | OXYGEN SATURATION: 100 %

## 2022-01-05 DIAGNOSIS — R06.02 SHORTNESS OF BREATH: ICD-10-CM

## 2022-01-05 DIAGNOSIS — Z95.5 PRESENCE OF CORONARY ANGIOPLASTY IMPLANT AND GRAFT: Chronic | ICD-10-CM

## 2022-01-05 DIAGNOSIS — R07.9 CHEST PAIN, UNSPECIFIED: ICD-10-CM

## 2022-01-05 DIAGNOSIS — R51.9 HEADACHE, UNSPECIFIED: ICD-10-CM

## 2022-01-05 DIAGNOSIS — I10 ESSENTIAL (PRIMARY) HYPERTENSION: ICD-10-CM

## 2022-01-05 DIAGNOSIS — Z79.82 LONG TERM (CURRENT) USE OF ASPIRIN: ICD-10-CM

## 2022-01-05 DIAGNOSIS — I25.10 ATHEROSCLEROTIC HEART DISEASE OF NATIVE CORONARY ARTERY WITHOUT ANGINA PECTORIS: ICD-10-CM

## 2022-01-05 LAB
ADD ON TEST-SPECIMEN IN LAB: SIGNIFICANT CHANGE UP
ALBUMIN SERPL ELPH-MCNC: 3.7 G/DL — SIGNIFICANT CHANGE UP (ref 3.3–5)
ALP SERPL-CCNC: 83 U/L — SIGNIFICANT CHANGE UP (ref 40–120)
ALT FLD-CCNC: 36 U/L — SIGNIFICANT CHANGE UP (ref 12–78)
ANION GAP SERPL CALC-SCNC: 6 MMOL/L — SIGNIFICANT CHANGE UP (ref 5–17)
AST SERPL-CCNC: 21 U/L — SIGNIFICANT CHANGE UP (ref 15–37)
BASOPHILS # BLD AUTO: 0.03 K/UL — SIGNIFICANT CHANGE UP (ref 0–0.2)
BASOPHILS NFR BLD AUTO: 0.3 % — SIGNIFICANT CHANGE UP (ref 0–2)
BILIRUB SERPL-MCNC: 1.4 MG/DL — HIGH (ref 0.2–1.2)
BUN SERPL-MCNC: 16 MG/DL — SIGNIFICANT CHANGE UP (ref 7–23)
CALCIUM SERPL-MCNC: 9.1 MG/DL — SIGNIFICANT CHANGE UP (ref 8.5–10.1)
CHLORIDE SERPL-SCNC: 108 MMOL/L — SIGNIFICANT CHANGE UP (ref 96–108)
CO2 SERPL-SCNC: 27 MMOL/L — SIGNIFICANT CHANGE UP (ref 22–31)
CREAT SERPL-MCNC: 0.86 MG/DL — SIGNIFICANT CHANGE UP (ref 0.5–1.3)
EOSINOPHIL # BLD AUTO: 0.05 K/UL — SIGNIFICANT CHANGE UP (ref 0–0.5)
EOSINOPHIL NFR BLD AUTO: 0.6 % — SIGNIFICANT CHANGE UP (ref 0–6)
GLUCOSE SERPL-MCNC: 95 MG/DL — SIGNIFICANT CHANGE UP (ref 70–99)
HCT VFR BLD CALC: 43.9 % — SIGNIFICANT CHANGE UP (ref 39–50)
HGB BLD-MCNC: 15.3 G/DL — SIGNIFICANT CHANGE UP (ref 13–17)
IMM GRANULOCYTES NFR BLD AUTO: 0.2 % — SIGNIFICANT CHANGE UP (ref 0–1.5)
LYMPHOCYTES # BLD AUTO: 1.58 K/UL — SIGNIFICANT CHANGE UP (ref 1–3.3)
LYMPHOCYTES # BLD AUTO: 18 % — SIGNIFICANT CHANGE UP (ref 13–44)
MCHC RBC-ENTMCNC: 31.8 PG — SIGNIFICANT CHANGE UP (ref 27–34)
MCHC RBC-ENTMCNC: 34.9 GM/DL — SIGNIFICANT CHANGE UP (ref 32–36)
MCV RBC AUTO: 91.3 FL — SIGNIFICANT CHANGE UP (ref 80–100)
MONOCYTES # BLD AUTO: 0.54 K/UL — SIGNIFICANT CHANGE UP (ref 0–0.9)
MONOCYTES NFR BLD AUTO: 6.2 % — SIGNIFICANT CHANGE UP (ref 2–14)
NEUTROPHILS # BLD AUTO: 6.56 K/UL — SIGNIFICANT CHANGE UP (ref 1.8–7.4)
NEUTROPHILS NFR BLD AUTO: 74.7 % — SIGNIFICANT CHANGE UP (ref 43–77)
PLATELET # BLD AUTO: 189 K/UL — SIGNIFICANT CHANGE UP (ref 150–400)
POTASSIUM SERPL-MCNC: 3.4 MMOL/L — LOW (ref 3.5–5.3)
POTASSIUM SERPL-SCNC: 3.4 MMOL/L — LOW (ref 3.5–5.3)
PROT SERPL-MCNC: 7.3 GM/DL — SIGNIFICANT CHANGE UP (ref 6–8.3)
RBC # BLD: 4.81 M/UL — SIGNIFICANT CHANGE UP (ref 4.2–5.8)
RBC # FLD: 12.1 % — SIGNIFICANT CHANGE UP (ref 10.3–14.5)
SARS-COV-2 RNA SPEC QL NAA+PROBE: SIGNIFICANT CHANGE UP
SODIUM SERPL-SCNC: 141 MMOL/L — SIGNIFICANT CHANGE UP (ref 135–145)
TROPONIN I, HIGH SENSITIVITY RESULT: 5.93 NG/L — SIGNIFICANT CHANGE UP
TROPONIN I, HIGH SENSITIVITY RESULT: 6.96 NG/L — SIGNIFICANT CHANGE UP
WBC # BLD: 8.78 K/UL — SIGNIFICANT CHANGE UP (ref 3.8–10.5)
WBC # FLD AUTO: 8.78 K/UL — SIGNIFICANT CHANGE UP (ref 3.8–10.5)

## 2022-01-05 PROCEDURE — 86850 RBC ANTIBODY SCREEN: CPT

## 2022-01-05 PROCEDURE — 80053 COMPREHEN METABOLIC PANEL: CPT

## 2022-01-05 PROCEDURE — 85025 COMPLETE CBC W/AUTO DIFF WBC: CPT

## 2022-01-05 PROCEDURE — U0005: CPT

## 2022-01-05 PROCEDURE — 99285 EMERGENCY DEPT VISIT HI MDM: CPT

## 2022-01-05 PROCEDURE — 85610 PROTHROMBIN TIME: CPT

## 2022-01-05 PROCEDURE — 36415 COLL VENOUS BLD VENIPUNCTURE: CPT

## 2022-01-05 PROCEDURE — 85730 THROMBOPLASTIN TIME PARTIAL: CPT

## 2022-01-05 PROCEDURE — 93010 ELECTROCARDIOGRAM REPORT: CPT

## 2022-01-05 PROCEDURE — 86901 BLOOD TYPING SEROLOGIC RH(D): CPT

## 2022-01-05 PROCEDURE — 86900 BLOOD TYPING SEROLOGIC ABO: CPT

## 2022-01-05 PROCEDURE — U0003: CPT

## 2022-01-05 PROCEDURE — 99284 EMERGENCY DEPT VISIT MOD MDM: CPT | Mod: 25

## 2022-01-05 PROCEDURE — 71045 X-RAY EXAM CHEST 1 VIEW: CPT | Mod: 26

## 2022-01-05 PROCEDURE — 84484 ASSAY OF TROPONIN QUANT: CPT

## 2022-01-05 PROCEDURE — 71045 X-RAY EXAM CHEST 1 VIEW: CPT

## 2022-01-05 PROCEDURE — 93005 ELECTROCARDIOGRAM TRACING: CPT

## 2022-01-05 NOTE — ED PROVIDER NOTE - PROGRESS NOTE DETAILS
Salazar BARAHONA: Spoke with Dr. Chavis- patient's private cardiologist- recommends 2 troponins; Dr. Hensley's team to see in ED (has already evaluated); pending second troponin at this time- s/o to Dr. Webb pending second trop and cardio recommendation.

## 2022-01-05 NOTE — ED ADULT TRIAGE NOTE - CHIEF COMPLAINT QUOTE
Pt brought in by ambulance from home complaining of chest pressure that began approx 1 hour prior to arrival. Pt with hx of stents and MI. Pt A&Ox4.

## 2022-01-05 NOTE — CONSULT NOTE ADULT - ASSESSMENT
60 y/o male with a PMHx of CAD s/p stents x12, depression, HTN, STEMI presents to the ED with c/o chest tightness and SOB. 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 Pt sat down in his office, felt lightheaded and like he was going to pass out. No LOC. Episode lasted 10 minutes then self resolved. Last cardiac cath 2 months ago.  First cardiac enzymes negative 5.93 EKG revealed NSR @ 67BPM no acute changes.      CAD  - Continue to trend enzymes if second set negative, pt. may be discharge. He is to follow-up with his cardiologist tomorrow for PET stress test as an outpatient.   -Continue current home medications upon discharge

## 2022-01-05 NOTE — ED ADULT NURSE NOTE - PERIPHERAL VASCULAR
likely 2/2 to protein malnutrition  Nutrition consult  Jevity feeds via PEG - - - Jones is a potential source of infection vs colonization.   Urology on board, will plan to change jones today vimpat on board. seizure precautions

## 2022-01-05 NOTE — ED PROVIDER NOTE - OBJECTIVE STATEMENT
60 y/o male with a PMHx of CAD s/p stents x12, depression, HTN, STEMI presents to the ED c/o chest tightness and SOB. 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 Pt sat down in his office, felt lightheaded and like he was going to pass out. No LOC. Episode lasted 10 minutes then self resolved. Last cardiac cath 2 months ago. No other complaints at this time. Cardio: Dr. Chavis.

## 2022-01-05 NOTE — ED PROVIDER NOTE - NSICDXPASTMEDICALHX_GEN_ALL_CORE_FT
PAST MEDICAL HISTORY:  CAD (coronary artery disease)     H/O: depression     HTN (hypertension)     STEMI (ST elevation myocardial infarction) at 49 y/o

## 2022-01-05 NOTE — CONSULT NOTE ADULT - SUBJECTIVE AND OBJECTIVE BOX
HPI:  Patient is a 61y old male PMH HTN, CAD, MI, 12 stents, who presents with a chief complaint of       PREVIOUS DIAGNOSTIC TESTING:    CATHETERIZATION  FINDINGS:  < from: Cardiac Catheterization (10.22.21 @ 08:36) >   Impression   Diagnostic Conclusions   Three Vessel coronary artery disease (lad,lcx,Rca) .   Estimated LV ejection fraction is 60 %.   Elevated left ventricular end-diastolic pressure 24 mm Hg.   Stenosed drug eluting stent in the mid LAD.   Interventional Conclusions   Successful Coronary Intervention PTCA of mid LAD.      MEDICATIONS  (STANDING):    REVIEW OF SYSTEMS:  CONSTITUTIONAL: No fever, weight loss, or fatigue  EYES: No eye pain, visual disturbances, or discharge  ENMT:  No difficulty hearing, tinnitus, vertigo; No sinus or throat pain  NECK: No pain or stiffness  BREASTS: No pain, masses, or nipple discharge  RESPIRATORY: No cough, wheezing, chills or hemoptysis; No shortness of breath  CARDIOVASCULAR:  +chest pain, palpitations, dizziness, or leg swelling  GASTROINTESTINAL: No abdominal or epigastric pain. No nausea, vomiting, or hematemesis; No diarrhea or constipation. No melena or hematochezia.  GENITOURINARY: No dysuria, frequency, hematuria, or incontinence  NEUROLOGICAL: No headaches, memory loss, loss of strength, numbness, or tremors  SKIN: No itching, burning, rashes, or lesions   ENDOCRINE: No heat or cold intolerance; No hair loss  MUSCULOSKELETAL: No joint pain or swelling; No muscle, back, or extremity pain  PSYCHIATRIC: No depression, anxiety, mood swings, or difficulty sleeping    PHYSICAL EXAM:  GENERAL: NAD, well-groomed, well-developed  HEAD:  Atraumatic, Normocephalic  EYES: EOMI, PERRLA, conjunctiva and sclera clear  ENMT: No tonsillar erythema, exudates, or enlargement; Moist mucous membranes, Good dentition, No lesions  NECK: Supple, No JVD, Normal thyroid  NERVOUS SYSTEM:  Alert & Oriented X3, Good concentration; Motor Strength 5/5 B/L upper and lower extremities; DTRs 2+ intact and symmetric  CHEST/LUNG: Clear to auscultation bilaterally; No rales, rhonchi, wheezing, or rubs  HEART: Regular rate and rhythm; No murmurs, rubs, or gallops  ABDOMEN: Soft, Nontender, Nondistended; Bowel sounds present  EXTREMITIES:  2+ Peripheral Pulses, No clubbing, cyanosis, or edema  SKIN: No rashes or lesions    MEDICATIONS  (PRN):    FAMILY HISTORY:  Family history of CABG (Father)      Vital Signs Last 24 Hrs  T(C): 36.7 (05 Jan 2022 13:36), Max: 36.7 (05 Jan 2022 13:36)  T(F): 98.1 (05 Jan 2022 13:36), Max: 98.1 (05 Jan 2022 13:36)  HR: 73 (05 Jan 2022 13:33) (73 - 73)  BP: 146/70 (05 Jan 2022 13:33) (146/70 - 146/70)  BP(mean): --  RR: 18 (05 Jan 2022 13:33) (18 - 18)  SpO2: 100% (05 Jan 2022 13:33) (100% - 100%)      ECG: NSR @67 BPM no acute changes     LABS:                        15.3   8.78  )-----------( 189      ( 05 Jan 2022 13:54 )             43.9     01-05    141  |  108  |  16  ----------------------------<  95  3.4<L>   |  27  |  0.86    Ca    9.1      05 Jan 2022 13:54    TPro  7.3  /  Alb  3.7  /  TBili  1.4<H>  /  DBili  x   /  AST  21  /  ALT  36  /  AlkPhos  83  01-05        PT/INR - ( 05 Jan 2022 13:54 )   PT: 13.5 sec;   INR: 1.17 ratio         PTT - ( 05 Jan 2022 13:54 )  PTT:30.9 sec    I&O's Summary   HPI:  60 y/o male with a PMHx of CAD s/p stents x12, depression, HTN, STEMI presents to the ED c/o chest tightness and SOB. 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 Pt sat down in his office, felt lightheaded and like he was going to pass out. No LOC. Episode lasted 10 minutes then self resolved. Last cardiac cath 2 months ago. No other complaints at this time. Cardio: Dr. Chavis.  First cardiac enzymes negative 5.93 EKG revealed NSR @ 67BPM no acute changes.      PREVIOUS DIAGNOSTIC TESTING:    CATHETERIZATION  FINDINGS:  < from: Cardiac Catheterization (10.22.21 @ 08:36) >   Impression   Diagnostic Conclusions   Three Vessel coronary artery disease (lad,lcx,Rca) .   Estimated LV ejection fraction is 60 %.   Elevated left ventricular end-diastolic pressure 24 mm Hg.   Stenosed drug eluting stent in the mid LAD.   Interventional Conclusions   Successful Coronary Intervention PTCA of mid LAD.      MEDICATIONS  (STANDING):    REVIEW OF SYSTEMS:  CONSTITUTIONAL: No fever, weight loss, or fatigue  EYES: No eye pain, visual disturbances, or discharge  ENMT:  No difficulty hearing, tinnitus, vertigo; No sinus or throat pain  NECK: No pain or stiffness  BREASTS: No pain, masses, or nipple discharge  RESPIRATORY: + shortness of breath  CARDIOVASCULAR:  +chest pain, +lightheadedness  GASTROINTESTINAL: No abdominal or epigastric pain. No nausea, vomiting, or hematemesis; No diarrhea or constipation. No melena or hematochezia.  GENITOURINARY: No dysuria, frequency, hematuria, or incontinence  NEUROLOGICAL: No headaches, memory loss, loss of strength, numbness, or tremors  SKIN: No itching, burning, rashes, or lesions   ENDOCRINE: No heat or cold intolerance; No hair loss  MUSCULOSKELETAL: No joint pain or swelling; No muscle, back, or extremity pain  PSYCHIATRIC: No depression, anxiety, mood swings, or difficulty sleeping    PHYSICAL EXAM:  GENERAL: NAD, well-groomed, well-developed  HEAD:  Atraumatic, Normocephalic  NECK: Supple, No JVD, Normal thyroid  NERVOUS SYSTEM:  Alert & Oriented X3, Good concentration; Motor Strength 5/5 B/L upper and lower extremities; DTRs 2+ intact and symmetric  CHEST/LUNG: Clear to auscultation bilaterally; No rales, rhonchi, wheezing, or rubs  HEART: Regular rate and rhythm; No murmurs, rubs, or gallops  ABDOMEN: Soft, Nontender, Nondistended; Bowel sounds present  EXTREMITIES:  2+ Peripheral Pulses, No clubbing, cyanosis, or edema  SKIN: No rashes or lesions    MEDICATIONS  (PRN):    FAMILY HISTORY:  Family history of CABG (Father)      Vital Signs Last 24 Hrs  T(C): 36.7 (05 Jan 2022 13:36), Max: 36.7 (05 Jan 2022 13:36)  T(F): 98.1 (05 Jan 2022 13:36), Max: 98.1 (05 Jan 2022 13:36)  HR: 73 (05 Jan 2022 13:33) (73 - 73)  BP: 146/70 (05 Jan 2022 13:33) (146/70 - 146/70)  BP(mean): --  RR: 18 (05 Jan 2022 13:33) (18 - 18)  SpO2: 100% (05 Jan 2022 13:33) (100% - 100%)      ECG: NSR @67 BPM no acute changes     LABS:                        15.3   8.78  )-----------( 189      ( 05 Jan 2022 13:54 )             43.9     01-05    141  |  108  |  16  ----------------------------<  95  3.4<L>   |  27  |  0.86    Ca    9.1      05 Jan 2022 13:54    TPro  7.3  /  Alb  3.7  /  TBili  1.4<H>  /  DBili  x   /  AST  21  /  ALT  36  /  AlkPhos  83  01-05        PT/INR - ( 05 Jan 2022 13:54 )   PT: 13.5 sec;   INR: 1.17 ratio         PTT - ( 05 Jan 2022 13:54 )  PTT:30.9 sec    I&O's Summary

## 2022-01-06 ENCOUNTER — NON-APPOINTMENT (OUTPATIENT)
Age: 62
End: 2022-01-06

## 2022-02-10 DIAGNOSIS — H10.9 UNSPECIFIED CONJUNCTIVITIS: ICD-10-CM

## 2022-06-07 ENCOUNTER — TRANSCRIPTION ENCOUNTER (OUTPATIENT)
Age: 62
End: 2022-06-07

## 2022-06-07 ENCOUNTER — EMERGENCY (EMERGENCY)
Facility: HOSPITAL | Age: 62
LOS: 0 days | Discharge: HOME | End: 2022-06-07
Attending: EMERGENCY MEDICINE
Payer: COMMERCIAL

## 2022-06-07 VITALS — HEIGHT: 75 IN | WEIGHT: 244.93 LBS

## 2022-06-07 VITALS
HEART RATE: 72 BPM | RESPIRATION RATE: 18 BRPM | DIASTOLIC BLOOD PRESSURE: 68 MMHG | TEMPERATURE: 98 F | OXYGEN SATURATION: 96 % | SYSTOLIC BLOOD PRESSURE: 136 MMHG

## 2022-06-07 DIAGNOSIS — Z20.822 CONTACT WITH AND (SUSPECTED) EXPOSURE TO COVID-19: ICD-10-CM

## 2022-06-07 DIAGNOSIS — I10 ESSENTIAL (PRIMARY) HYPERTENSION: ICD-10-CM

## 2022-06-07 DIAGNOSIS — Z79.02 LONG TERM (CURRENT) USE OF ANTITHROMBOTICS/ANTIPLATELETS: ICD-10-CM

## 2022-06-07 DIAGNOSIS — U07.1 COVID-19: ICD-10-CM

## 2022-06-07 DIAGNOSIS — Z79.82 LONG TERM (CURRENT) USE OF ASPIRIN: ICD-10-CM

## 2022-06-07 DIAGNOSIS — I25.2 OLD MYOCARDIAL INFARCTION: ICD-10-CM

## 2022-06-07 DIAGNOSIS — Z20.828 CONTACT WITH AND (SUSPECTED) EXPOSURE TO OTHER VIRAL COMMUNICABLE DISEASES: ICD-10-CM

## 2022-06-07 DIAGNOSIS — R50.9 FEVER, UNSPECIFIED: ICD-10-CM

## 2022-06-07 DIAGNOSIS — R05.9 COUGH, UNSPECIFIED: ICD-10-CM

## 2022-06-07 DIAGNOSIS — Z95.5 PRESENCE OF CORONARY ANGIOPLASTY IMPLANT AND GRAFT: Chronic | ICD-10-CM

## 2022-06-07 DIAGNOSIS — Z95.5 PRESENCE OF CORONARY ANGIOPLASTY IMPLANT AND GRAFT: ICD-10-CM

## 2022-06-07 DIAGNOSIS — Z86.79 PERSONAL HISTORY OF OTHER DISEASES OF THE CIRCULATORY SYSTEM: ICD-10-CM

## 2022-06-07 DIAGNOSIS — F32.A DEPRESSION, UNSPECIFIED: ICD-10-CM

## 2022-06-07 PROCEDURE — 99284 EMERGENCY DEPT VISIT MOD MDM: CPT

## 2022-06-07 PROCEDURE — 99284 EMERGENCY DEPT VISIT MOD MDM: CPT | Mod: 25

## 2022-06-07 PROCEDURE — 96374 THER/PROPH/DIAG INJ IV PUSH: CPT

## 2022-06-07 RX ORDER — BEBTELOVIMAB 87.5 MG/ML
175 INJECTION, SOLUTION INTRAVENOUS ONCE
Refills: 0 | Status: COMPLETED | OUTPATIENT
Start: 2022-06-07 | End: 2022-06-07

## 2022-06-07 RX ADMIN — BEBTELOVIMAB 175 MILLIGRAM(S): 87.5 INJECTION, SOLUTION INTRAVENOUS at 11:22

## 2022-06-07 NOTE — ED STATDOCS - NS ED ROS FT
Constitutional: +fever or chills  Eyes: No visual changes  HEENT: No throat pain  CV: No chest pain  Resp: No SOB +cough  GI: No abd pain, nausea or vomiting  : No dysuria  MSK: No musculoskeletal pain  Skin: No rash  Neuro: No headache

## 2022-06-07 NOTE — ED STATDOCS - PHYSICAL EXAMINATION
Constitutional: NAD AOx3  Eyes: PERRL EOMI  Head: Normocephalic atraumatic  Mouth: MMM  Cardiac: regular rate and rhythm  Resp: Lungs CTAB  GI: Abd s/nd/nt  Neuro: CN2-12 grossly intact, HERNANDEZ x 4  Skin: No visible rashes

## 2022-06-07 NOTE — ED ADULT TRIAGE NOTE - CHIEF COMPLAINT QUOTE
Pt a/ox3, reports cough, sweats, and chills. pt report testing COVID-19 this morning. pt denies SOB/CP.

## 2022-06-07 NOTE — ED STATDOCS - NS_EDPROVIDERDISPOUSERTYPE_ED_A_ED
Scribe Attestation (For Scribes USE Only)... stable Attending Attestation (For Attendings USE Only).../Scribe Attestation (For Scribes USE Only)...

## 2022-06-07 NOTE — ED STATDOCS - CLINICAL SUMMARY MEDICAL DECISION MAKING FREE TEXT BOX
Plan: MESERET Plan: MAB    Patient has history of COVID-19 symptoms for [  ] days    Patient has high risk factors that include:  [  ] Chronic Kidney Disease [  ] Diabetes Mellitus [  ] Immune Suppressive [  ] Receiving Immunosuppressive Treatment [  ] Overweight/Obesity BMI greater than 25 [  ] Age greater than or equal 65 years [x ] Cardiovascular Disease [x  ] HTN [  ] COPD/Other Chronic Respiratory Disease [  ] Sickle Cell Disease [  ] Congenital/Acquired Heart Disease [  ] Neurodevelopmental Disorder [  ] Medical related technology dependence [  ] Asthma/Chronic Respiratory Disease [  ] Immune Suppressive Disease [  ] Receiving Immune Suppressive Therapy [  ] Other:     Patient received prior COVID treatment that included [  ]  Patient provided explanation of monoclonal antibody infusion and is in agreement with treatment plan. See consent form in medical record.    [ x ] Patient received monoclonal antibody infusion with no adverse reaction. Patient planned for discharge home and follow up with outpatient [facility] program.    [  ] Patient received monoclonal antibody infusion and experienced an adverse reaction. Symptoms include [  ]. Patient treated with [  ]. Patient planned for [  ]

## 2022-06-07 NOTE — ED STATDOCS - PROGRESS NOTE DETAILS
61 y/o M with PMH of STEMI, CAD with stent x 12, HTN, HLD presents with fever, chills, cough x 2 days. 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 Pt vaccinated for covid plus booster in 11/21. PE: Well appearing, Cardiac: s1s2, RRR. Lungs: CTAB. Abdomen: NBS x4, soft, nontender. A/P: Covid, pt requesting MAB therapy, will screen, administer and observe. - Sean Ortez PA-C MAB screening completed, consent signed and placed in paper chart. - Sean Ortez PA-C

## 2022-06-07 NOTE — ED STATDOCS - NS ED ATTENDING STATEMENT MOD
I have seen and examined this patient and fully participated in the care of this patient as the teaching attending.  The service was shared with the STEPHEN.  I reviewed and verified the documentation and independently performed the documented:

## 2022-06-07 NOTE — ED ADULT NURSE NOTE - OBJECTIVE STATEMENT
Pt presents to ED for cough, fever/chills since yesterday. Pt states he took a rapid COVID test this morning and tested positive. Pt denies SOB, chest pain, dizziness. Took tylenol this morning. Requesting MAB.

## 2022-06-07 NOTE — ED STATDOCS - OBJECTIVE STATEMENT
63 y/o M with PMHx of STEMI, depression, HTN, CAD, presents to ED after testing positive for COVID-19 this morning. +Fever, chills, mild cough since yesterday. Pt did a rapid antigen test in his office and was positive for COVID. Pt has had 3 Moderna shots, latest shot was November 2021. Here requesting MAB.

## 2022-06-07 NOTE — ED STATDOCS - NSFOLLOWUPINSTRUCTIONS_ED_ALL_ED_FT
COVID-19: Quarantine and Isolation      Quarantine    If you were exposed     Quarantine and stay away from others when you have been in close contact with someone who has COVID-19.      Isolate    If you are sick or test positive     Isolate when you are sick or when you have COVID-19, even if you don't have symptoms.      When to stay home    Calculating quarantine     The date of your exposure is considered day 0. Day 1 is the first full day after your last contact with a person who has had COVID-19. Stay home and away from other people for at least 5 days. Learn why CDC updated guidance for the general public.    IF YOU were exposed to COVID-19 and are NOT    up to date   IF YOU were exposed to COVID-19 and are NOT on COVID-19 vaccinations   • Quarantine for at least 5 days • Stay home   •Stay home and quarantine for at least 5 full days.      •Wear a well-fitting mask if you must be around others in your home.        • Do not travel.     • Get tested   •Even if you don't develop symptoms, get tested at least 5 days after you last had close contact with someone with COVID-19.        • After quarantine • Watch for symptoms   •Watch for symptoms until 10 days after you last had close contact with someone with COVID-19.      • Avoid travel   •It is best to avoid travel until a full 10 days after you last had close contact with someone with COVID-19.      • If you develop symptoms   • Isolate immediately and get tested. Continue to stay home until you know the results. Wear a well-fitting mask around others.        • Take precautions until day 10 • Wear a well-fitting mask   •Wear a well-fitting mask for 10 full days any time you are around others inside your home or in public. Do not go to places where you are unable to wear a well-fitting mask.        • If you must travel during days 6–10, take precautions.       • Avoid being around people who are more likely to get very sick from COVID-19.         IF YOU were exposed to COVID-19 and are    up to date   IF YOU were exposed to COVID-19 and are on COVID-19 vaccinations   • No quarantine   •You do not need to stay home unless you develop symptoms.      • Get tested   •Even if you don't develop symptoms, get tested at least 5 days after you last had close contact with someone with COVID-19.      • Watch for symptoms   •Watch for symptoms until 10 days after you last had close contact with someone with COVID-19.    • If you develop symptoms   • Isolate immediately and get tested. Continue to stay home until you know the results. Wear a well-fitting mask around others.        • Take precautions until day 10 • Wear a well-fitting mask   •Wear a well-fitting mask for 10 full days any time you are around others inside your home or in public. Do not go to places where you are unable to wear a well-fitting mask.        • Take precautions if traveling        • Avoid being around people who are more likely to get very sick from COVID-19.         IF YOU were exposed to COVID-19 and had confirmed COVID-19 within the past 90 days (you tested positive using a viral test)   • No quarantine   •You do not need to stay home unless you develop symptoms.      • Watch for symptoms   •Watch for symptoms until 10 days after you last had close contact with someone with COVID-19.    • If you develop symptoms   • Isolate immediately and get tested. Continue to stay home until you know the results. Wear a well-fitting mask around others.        • Take precautions until day 10 • Wear a well-fitting mask   •Wear a well-fitting mask for 10 full days any time you are around others inside your home or in public. Do not go to places where you are unable to wear a well-fitting mask.        • Take precautions if traveling        • Avoid being around people who are more likely to get very sick from COVID-19.         Calculating isolation     Day 0 is your first day of symptoms or a positive viral test. Day 1 is the first full day after your symptoms developed or your test specimen was collected. If you have COVID-19 or have symptoms, isolate for at least 5 days.    IF YOU tested positive for COVID-19 or have symptoms, regardless of vaccination status   • Stay home for at least 5 days   •Stay home for 5 days and isolate from others in your home.      •Wear a well-fitting mask if you must be around others in your home.      • Do not travel.       • Ending isolation if you had symptoms   • End isolation after 5 full days if you are fever-free for 24 hours (without the use of fever-reducing medication) and your symptoms are improving.      • Ending isolation if you did NOT have symptoms   • End isolation after at least 5 full days after your positive test.      • If you got very sick from COVID-19 or have a weakened immune system   •You should isolate for at least 10 days. Consult your doctor before ending isolation.      • Take precautions until day 10 • Wear a well-fitting mask   •Wear a well-fitting mask for 10 full days any time you are around others inside your home or in public. Do not go to places where you are unable to wear a well-fitting mask.      • Do not travel   • Do not travel until a full 10 days after your symptoms started or the date your positive test was taken if you had no symptoms.        • Avoid being around people who are more likely to get very sick from COVID-19.           Definitions    Exposure     Contact with someone infected with SARS-CoV-2, the virus that causes COVID-19, in a way that increases the likelihood of getting infected with the virus.    Close contact     A close contact is someone who was less than 6 feet away from an infected person (laboratory-confirmed or a clinical diagnosis) for a cumulative total of 15 minutes or more over a 24-hour period. For example, three individual 5-minute exposures for a total of 15 minutes. People who are exposed to someone with COVID-19 after they completed at least 5 days of isolation are not considered close contacts.      Quarantine    Quarantine is a strategy used to prevent transmission of COVID-19 by keeping people who have been in close contact with someone with COVID-19 apart from others.    Who does not need to quarantine?     If you had close contact with someone with COVID-19 and you are in one of the following groups, you do not need to quarantine.  •You are up to date with your COVID-19 vaccines.      •You had confirmed COVID-19 within the last 90 days (meaning you tested positive using a viral test).      If you are up to date with COVID-19 vaccines, you should wear a well-fitting mask around others for 10 days from the date of your last close contact with someone with COVID-19 (the date of last close contact is considered day 0). Get tested at least 5 days after you last had close contact with someone with COVID-19. If you test positive or develop COVID-19 symptoms, isolate from other people and follow recommendations in the Isolation section below. If you tested positive for COVID-19 with a viral test within the previous 90 days and subsequently recovered and remain without COVID-19 symptoms, you do not need to quarantine or get tested after close contact. You should wear a well-fitting mask around others for 10 days from the date of your last close contact with someone with COVID-19 (the date of last close contact is considered day 0). If you have COVID-19 symptoms, get tested and isolate from other people and follow recommendations in the Isolation section below.    Who should quarantine?    If you come into close contact with someone with COVID-19, you should quarantine if you are not up to date on COVID-19 vaccines. This includes people who are not vaccinated.    What to do for quarantine    •Stay home and away from other people for at least 5 days (day 0 through day 5) after your last contact with a person who has COVID-19. The date of your exposure is considered day 0. Wear a well-fitting mask when around others at home, if possible.      •For 10 days after your last close contact with someone with COVID-19, watch for fever (100.4°F or greater), cough, shortness of breath, or other COVID-19 symptoms.      •If you develop symptoms, get tested immediately and isolate until you receive your test results. If you test positive, follow isolation recommendations.    •If you do not develop symptoms, get tested at least 5 days after you last had close contact with someone with COVID-19.  •If you test negative, you can leave your home, but continue to wear a well-fitting mask when around others at home and in public until 10 days after your last close contact with someone with COVID-19.      •If you test positive, you should isolate for at least 5 days from the date of your positive test (if you do not have symptoms). If you do develop COVID-19 symptoms, isolate for at least 5 days from the date your symptoms began (the date the symptoms started is day 0). Follow recommendations in the isolation section below.      •If you are unable to get a test 5 days after last close contact with someone with COVID-19, you can leave your home after day 5 if you have been without COVID-19 symptoms throughout the 5-day period. Wear a well-fitting mask for 10 days after your date of last close contact when around others at home and in public.      •Avoid people who are have weakened immune systems or are more likely to get very sick from COVID-19, and nursing homes and other high-risk settings, until after at least 10 days.        •If possible, stay away from people you live with, especially people who are at higher risk for getting very sick from COVID-19, as well as others outside your home throughout the full 10 days after your last close contact with someone with COVID-19.      •If you are unable to quarantine, you should wear a well-fitting mask for 10 days when around others at home and in public.      •If you are unable to wear a mask when around others, you should continue to quarantine for 10 days. Avoid people who have weakened immune systems or are more likely to get very sick from COVID-19, and nursing homes and other high-risk settings, until after at least 10 days.      •See additional information about travel.      •Do not go to places where you are unable to wear a mask, such as restaurants and some gyms, and avoid eating around others at home and at work until after 10 days after your last close contact with someone with COVID-19.      After quarantine    •Watch for symptoms until 10 days after your last close contact with someone with COVID-19.      •If you have symptoms, isolate immediately and get tested.      Quarantine in high-risk congregate settings    In certain congregate settings that have high risk of secondary transmission (such as correctional and residential facilities, homeless shelters, or cruise ships), CDC recommends a 10-day quarantine for residents, regardless of vaccination and booster status. During periods of critical staffing shortages, facilities may consider shortening the quarantine period for staff to ensure continuity of operations. Decisions to shorten quarantine in these settings should be made in consultation with state, local, [place], or territorial health departments and should take into consideration the context and characteristics of the facility. CDC's setting-specific guidance provides additional recommendations for these settings.      Isolation    Isolation is used to separate people with confirmed or suspected COVID-19 from those without COVID-19. People who are in isolation should stay home until it's safe for them to be around others. At home, anyone sick or infected should separate from others, or wear a well-fitting mask when they need to be around others. People in isolation should stay in a specific "sick room" or area and use a separate bathroom if available. Everyone who has presumed or confirmed COVID-19 should stay home and isolate from other people for at least 5 full days (day 0 is the first day of symptoms or the date of the day of the positive viral test for asymptomatic persons). They should wear a mask when around others at home and in public for an additional 5 days. People who are confirmed to have COVID-19 or are showing symptoms of COVID-19 need to isolate regardless of their vaccination status. This includes:  •People who have a positive viral test for COVID-19, regardless of whether or not they have symptoms.      •People with symptoms of COVID-19, including people who are awaiting test results or have not been tested. People with symptoms should isolate even if they do not know if they have been in close contact with someone with COVID-19.      What to do for isolation    •Monitor your symptoms. If you have an emergency warning sign (including trouble breathing), seek emergency medical care immediately.      •Stay in a separate room from other household members, if possible.      •Use a separate bathroom, if possible.      •Take steps to improve ventilation at home, if possible.      •Avoid contact with other members of the household and pets.      •Don't share personal household items, like cups, towels, and utensils.      •Wear a well-fitting mask when you need to be around other people.      Learn more about what to do if you are sick and how to notify your contacts.    Ending isolation for people who had COVID-19 and had symptoms    If you had COVID-19 and had symptoms, isolate for at least 5 days. To calculate your 5-day isolation period, day 0 is your first day of symptoms. Day 1 is the first full day after your symptoms developed. You can leave isolation after 5 full days.  •You can end isolation after 5 full days if you are fever-free for 24 hours without the use of fever-reducing medication and your other symptoms have improved (Loss of taste and smell may persist for weeks or months after recovery and need not delay the end of isolation).      •You should continue to wear a well-fitting mask around others at home and in public for 5 additional days (day 6 through day 10) after the end of your 5-day isolation period. If you are unable to wear a mask when around others, you should continue to isolate for a full 10 days. Avoid people who have weakened immune systems or are more likely to get very sick from COVID-19, and nursing homes and other high-risk settings, until after at least 10 days.      •If you continue to have fever or your other symptoms have not improved after 5 days of isolation, you should wait to end your isolation until you are fever-free for 24 hours without the use of fever-reducing medication and your other symptoms have improved. Continue to wear a well-fitting mask through day 10. Contact your healthcare provider if you have questions.      •See additional information about travel.      •Do not go to places where you are unable to wear a mask, such as restaurants and some gyms, and avoid eating around others at home and at work until a full 10 days after your first day of symptoms.      If an individual has access to a test and wants to test, the best approach is to use an antigen test1 towards the end of the 5-day isolation period. Collect the test sample only if you are fever-free for 24 hours without the use of fever-reducing medication and your other symptoms have improved (loss of taste and smell may persist for weeks or months after recovery and need not delay the end of isolation). If your test result is positive, you should continue to isolate until day 10. If your test result is negative, you can end isolation, but continue to wear a well-fitting mask around others at home and in public until day 10. Follow additional recommendations for masking and avoiding travel as described above.    1As noted in the labeling for authorized over-the counter antigen tests: Negative results should be treated as presumptive. Negative results do not rule out SARS-CoV-2 infection and should not be used as the sole basis for treatment or patient management decisions, including infection control decisions. To improve results, antigen tests should be used twice over a three-day period with at least 24 hours and no more than 48 hours between tests.    Note that these recommendations on ending isolation do not apply to people who are moderately ill or very sick from COVID-19 or have weakened immune systems. See section below for recommendations for when to end isolation for these groups.    Ending isolation for people who tested positive for COVID-19 but had no symptoms    If you test positive for COVID-19 and never develop symptoms, isolate for at least 5 days. Day 0 is the day of your positive viral test (based on the date you were tested) and day 1 is the first full day after the specimen was collected for your positive test. You can leave isolation after 5 full days.  •If you continue to have no symptoms, you can end isolation after at least 5 days.      •You should continue to wear a well-fitting mask around others at home and in public until day 10 (day 6 through day 10). If you are unable to wear a mask when around others, you should continue to isolate for 10 days. Avoid people who have weakened immune systems or are more likely to get very sick from COVID-19, and nursing homes and other high-risk settings, until after at least 10 days.      •If you develop symptoms after testing positive, your 5-day isolation period should start over. Day 0 is your first day of symptoms. Follow the recommendations above for ending isolation for people who had COVID-19 and had symptoms.      •See additional information about travel.      •Do not go to places where you are unable to wear a mask, such as restaurants and some gyms, and avoid eating around others at home and at work until 10 days after the day of your positive test.      If an individual has access to a test and wants to test, the best approach is to use an antigen test1 towards the end of the 5-day isolation period. If your test result is positive, you should continue to isolate until day 10. If your test result is positive, you can also choose to test daily and if your test result is negative, you can end isolation, but continue to wear a well-fitting mask around others at home and in public until day 10. Follow additional recommendations for masking and avoiding travel as described above.    1As noted in the labeling for authorized over-the counter antigen tests: Negative results should be treated as presumptive. Negative results do not rule out SARS-CoV-2 infection and should not be used as the sole basis for treatment or patient management decisions, including infection control decisions. To improve results, antigen tests should be used twice over a three-day period with at least 24 hours and no more than 48 hours between tests.    Ending isolation for people who were moderately or very sick from COVID-19 or have a weakened immune system    People who are moderately ill from COVID-19 (experiencing symptoms that affect the lungs like shortness of breath or difficulty breathing) should isolate for 10 days and follow all other isolation precautions. To calculate your 10-day isolation period, day 0 is your first day of symptoms. Day 1 is the first full day after your symptoms developed. If you are unsure if your symptoms are moderate, talk to a healthcare provider for further guidance.    People who are very sick from COVID-19 (this means people who were hospitalized or required intensive care or ventilation support) and people who have weakened immune systems might need to isolate at home longer. They may also require testing with a viral test to determine when they can be around others. CDC recommends an isolation period of at least 10 and up to 20 days for people who were very sick from COVID-19 and for people with weakened immune systems. Consult with your healthcare provider about when you can resume being around other people. If you are unsure if your symptoms are severe or if you have a weakened immune system, talk to a healthcare provider for further guidance.    People who have a weakened immune system should talk to their healthcare provider about the potential for reduced immune responses to COVID-19 vaccines and the need to continue to follow current prevention measures (including wearing a well-fitting mask and avoiding crowds and poorly ventilated indoor spaces) to protect themselves against COVID-19 until advised otherwise by their healthcare provider. Close contacts of immunocompromised people—including household members—should also be encouraged to receive all recommended COVID-19 vaccine doses to help protect these people.    Isolation in high-risk congregate settings    In certain high-risk congregate settings that have high risk of secondary transmission and where it is not feasible to cohort people (such as correctional and residential facilities, homeless shelters, and cruise ships), CDC recommends a 10-day isolation period for residents. During periods of critical staffing shortages, facilities may consider shortening the isolation period for staff to ensure continuity of operations. Decisions to shorten isolation in these settings should be made in consultation with state, local, [place], or territorial health departments and should take into consideration the context and characteristics of the facility. CDC's setting-specific guidance provides additional recommendations for these settings.    This CDC guidance is meant to supplement—not replace—any federal, state, local, territorial, or [place] health and safety laws, rules, and regulations.      Recommendations for specific settings    These recommendations do not apply to healthcare professionals. For guidance specific to these settings, see  •Healthcare professionals: Interim Guidance for Managing Healthcare Personnel with SARS-CoV-2 Infection or Exposure to SARS-CoV-2      •Patients, residents, and visitors to healthcare settings: Interim Infection Prevention and Control Recommendations for Healthcare Personnel During the Coronavirus Disease 2019 (COVID-19) Pandemic      Additional setting-specific guidance and recommendations are available.  •These recommendations on quarantine and isolation do apply to - School settings. Additional guidance is available here: Overview of COVID-19 Quarantine for [facility]      •Travelers: Travel information and recommendations      •Congregate facilities and other settings: guidance pages for community, work, and school settings        Ongoing COVID-19 exposure FAQs    I live with someone with COVID-19, but I cannot be  from them. How do we manage quarantine in this situation?     It is very important for people with COVID-19 to remain apart from other people, if possible, even if they are living together. If separation of the person with COVID-19 from others that they live with is not possible, the other people that they live with will have ongoing exposure, meaning they will be repeatedly exposed until that person is no longer able to spread the virus to other people. In this situation, there are precautions you can take to limit the spread of COVID-19:  •The person with COVID-19 and everyone they live with should wear a well-fitting mask inside the home.      •If possible, one person should care for the person with COVID-19 to limit the number of people who are in close contact with the infected person.    •Take steps to protect yourself and others to reduce transmission in the home:  •Quarantine if you are not up to date with your COVID-19 vaccines.      •Isolate if you are sick or tested positive for COVID-19, even if you don't have symptoms.      •Learn more about the public health recommendations for testing, mask use and quarantine of close contacts, like yourself, who have ongoing exposure. These recommendations differ depending on your vaccination status.        What should I do if I have ongoing exposure to COVID-19 from someone I live with?    Recommendations for this situation depend on your vaccination status:    If you are not up to date on COVID-19 vaccines and have ongoing exposure to COVID-19, you should:  •Begin quarantine immediately and continue to quarantine throughout the isolation period of the person with COVID-19.      •Continue to quarantine for an additional 5 days starting the day after the end of isolation for the person with COVID-19.    •Get tested at least 5 days after the end of isolation of the infected person that lives with them.   •If you test negative, you can leave the home but should continue to wear a well-fitting mask when around others at home and in public until 10 days after the end of isolation for the person with COVID-19.         •Isolate immediately if you develop symptoms of COVID-19 or test positive.      If you are up to date with COVID-19 vaccines and have ongoing exposure to COVID-19, you should:  •Get tested at least 5 days after your first exposure. A person with COVID-19 is considered infectious starting 2 days before they develop symptoms, or 2 days before the date of their positive test if they do not have symptoms.      •Get tested again at least 5 days after the end of isolation for the person with COVID-19.      •Wear a well-fitting mask when you are around the person with COVID-19, and do this throughout their isolation period.      •Wear a well-fitting mask around others for 10 days after the infected person's isolation period ends.      Isolate immediately if you develop symptoms of COVID-19 or test positive.    What should I do if multiple people I live with test positive for COVID-19 at different times?    Recommendations for this situation depend on your vaccination status:•If you are not up to date with your COVID-19 vaccines, you should:  •Quarantine throughout the isolation period of any infected person that you live with.      •Continue to quarantine until 5 days after the end of isolation date for the most recently infected person that lives with you. For example, if the last day of isolation of the person most recently infected with COVID-19 was June 30, the new 5-day quarantine period starts on July 1.      •Get tested at least 5 days after the end of isolation for the most recently infected person that lives with you.      •Wear a well-fitting mask when you are around any person with COVID-19 while that person is in isolation.      •Wear a well-fitting mask when you are around other people until 10 days after your last close contact.      •Isolate immediately if you develop symptoms of COVID-19 or test positive.      •If you are up to date with your COVID-19 vaccines, you should:  •Get tested at least 5 days after your first exposure. A person with COVID-19 is considered infectious starting 2 days before they developed symptoms, or 2 days before the date of their positive test if they do not have symptoms.      •Get tested again at least 5 days after the end of isolation for the most recently infected person that lives with you.      •Wear a well-fitting mask when you are around any person with COVID-19 while that person is in isolation.      •Wear a well-fitting mask around others for 10 days after the end of isolation for the most recently infected person that lives with you. For example, if the last day of isolation for the person most recently infected with COVID-19 was June 30, the new 10-day period to wear a well-fitting mask indoors in public starts on July 1.      •Isolate immediately if you develop symptoms of COVID-19 or test positive.        I had COVID-19 and completed isolation. Do I have to quarantine or get tested if someone I live with gets COVID-19 shortly after I completed isolation?    No. If you recently completed isolation and someone that lives with you tests positive for the virus that causes COVID-19 shortly after the end of your isolation period, you do not have to quarantine or get tested as long as you do not develop new symptoms. Once all of the people that live together have completed isolation or quarantine, refer to the guidance below for new exposures to COVID-19.•If you had COVID-19 in the previous 90 days and then came into close contact with someone with COVID-19, you do not have to quarantine or get tested if you do not have symptoms. But you should:  •Wear a well-fitting mask indoors in public for 10 days after your last close contact.      •Monitor for COVID-19 symptoms for 10 days from the date of your last close contact.      •Isolate immediately and get tested if symptoms develop.        •If more than 90 days have passed since your recovery from infection, follow CDC's recommendations for close contacts. These recommendations will differ depending on your vaccination status.      03/30/2022    Content source: National Center for Immunization and Respiratory Diseases (NCIRD), Division of Viral Diseases    This information is not intended to replace advice given to you by your health care provider. Make sure you discuss any questions you have with your health care provider.

## 2022-06-07 NOTE — ED ADULT NURSE NOTE - NS ED PATIENT SAFETY CONCERN
Reason for Disposition  • COVID-19 vaccine, systemic reactions (e.g., fatigue, fever, muscle aches), questions about    Protocols used: CORONAVIRUS (COVID-19) VACCINE QUESTIONS AND GYLTDLNLE-F-PO    
Patient has the following symptoms: cough, sinus issues, headache on and off.  The symptoms have been present for 4 days  Patient was not offered an appointment.  Patient had his first vaccine on Saturday and he says he got sick right away.  He thought the symptoms would be gone by now.  He says he gets a new symptom every day.  One goes away and one starts.  Please call to advise what he should do.  Pharmacy has been verified.  [facility] - [address]  
Spoke with patient.  He received first dose of COVID vaccine on 5/29/21.  He reports that the first two days he had body aches.  This has resolved.  He has since developed intermittent headaches, cough, congestion, sore throat.  He denies any vomiting, diarrhea, fevers, loss of taste or smell.  He is wondering if this could be from the vaccine or something else.  Site of vaccination looks normal- no redness or swelling.  Writer advised that usually side effects resolve in 48 hrs.  Does sound like patient may have a virus or allergies.  Patient states that mom is also not feeling well (she is fully covid vaccinated for the past month).  Writer recommended pushing fluids, nasal saline rinse, cough drops, otc allergy medication such as Claritin or zyrtec to see if this helps.  Patient's questions answered.   
No

## 2022-06-07 NOTE — ED STATDOCS - PATIENT PORTAL LINK FT
You can access the FollowMyHealth Patient Portal offered by [facility] by registering at the following website: [email or web address]. By joining [facility]’s FollowMyHealth portal, you will also be able to view your health information using other applications (apps) compatible with our system.

## 2022-06-09 LAB
RAPID RVP RESULT: DETECTED
SARS-COV-2 RNA PNL RESP NAA+PROBE: DETECTED

## 2023-01-09 DIAGNOSIS — R35.1 NOCTURIA: ICD-10-CM

## 2023-01-23 DIAGNOSIS — L21.9 SEBORRHEIC DERMATITIS, UNSPECIFIED: ICD-10-CM

## 2023-01-23 RX ORDER — CLOBETASOL PROPIONATE 0.5 MG/G
0.05 CREAM TOPICAL TWICE DAILY
Qty: 90 | Refills: 3 | Status: ACTIVE | COMMUNITY
Start: 2023-01-23 | End: 1900-01-01

## 2023-02-23 DIAGNOSIS — F41.9 ANXIETY DISORDER, UNSPECIFIED: ICD-10-CM

## 2023-02-23 DIAGNOSIS — F32.A ANXIETY DISORDER, UNSPECIFIED: ICD-10-CM

## 2023-04-07 DIAGNOSIS — J06.9 ACUTE UPPER RESPIRATORY INFECTION, UNSPECIFIED: ICD-10-CM

## 2023-04-12 ENCOUNTER — RX RENEWAL (OUTPATIENT)
Age: 63
End: 2023-04-12

## 2023-07-27 LAB
ANION GAP SERPL CALC-SCNC: 12 MMOL/L
APTT BLD: 32.3 SEC
BUN SERPL-MCNC: 20 MG/DL
CALCIUM SERPL-MCNC: 9.5 MG/DL
CHLORIDE SERPL-SCNC: 105 MMOL/L
CO2 SERPL-SCNC: 26 MMOL/L
CREAT SERPL-MCNC: 0.82 MG/DL
EGFR: 99 ML/MIN/1.73M2
GLUCOSE SERPL-MCNC: 107 MG/DL
INR PPP: 0.91 RATIO
POTASSIUM SERPL-SCNC: 4.4 MMOL/L
PT BLD: 10.3 SEC
SODIUM SERPL-SCNC: 143 MMOL/L

## 2023-07-28 ENCOUNTER — TRANSCRIPTION ENCOUNTER (OUTPATIENT)
Age: 63
End: 2023-07-28

## 2023-07-28 ENCOUNTER — OUTPATIENT (OUTPATIENT)
Dept: OUTPATIENT SERVICES | Facility: HOSPITAL | Age: 63
LOS: 1 days | End: 2023-07-28
Payer: COMMERCIAL

## 2023-07-28 VITALS — HEART RATE: 70 BPM | HEIGHT: 75 IN | WEIGHT: 240.08 LBS

## 2023-07-28 VITALS
HEART RATE: 82 BPM | OXYGEN SATURATION: 100 % | DIASTOLIC BLOOD PRESSURE: 79 MMHG | TEMPERATURE: 98 F | SYSTOLIC BLOOD PRESSURE: 134 MMHG | RESPIRATION RATE: 17 BRPM

## 2023-07-28 DIAGNOSIS — I20.0 UNSTABLE ANGINA: ICD-10-CM

## 2023-07-28 DIAGNOSIS — Z95.5 PRESENCE OF CORONARY ANGIOPLASTY IMPLANT AND GRAFT: Chronic | ICD-10-CM

## 2023-07-28 PROCEDURE — 92920 PRQ TRLUML C ANGIOP 1ART&/BR: CPT | Mod: LD

## 2023-07-28 PROCEDURE — 93454 CORONARY ARTERY ANGIO S&I: CPT | Mod: 59

## 2023-07-28 PROCEDURE — C1769: CPT

## 2023-07-28 PROCEDURE — C1761: CPT

## 2023-07-28 PROCEDURE — 0715T: CPT

## 2023-07-28 PROCEDURE — 93010 ELECTROCARDIOGRAM REPORT: CPT | Mod: 76

## 2023-07-28 PROCEDURE — 99152 MOD SED SAME PHYS/QHP 5/>YRS: CPT

## 2023-07-28 PROCEDURE — C1725: CPT

## 2023-07-28 PROCEDURE — 92972 PERQ TRLUML CORONRY LITHOTRP: CPT

## 2023-07-28 PROCEDURE — 93454 CORONARY ARTERY ANGIO S&I: CPT | Mod: 26,59

## 2023-07-28 PROCEDURE — 93005 ELECTROCARDIOGRAM TRACING: CPT

## 2023-07-28 PROCEDURE — C1887: CPT

## 2023-07-28 PROCEDURE — C1894: CPT

## 2023-07-28 RX ORDER — VENLAFAXINE HCL 75 MG
150 CAPSULE, EXT RELEASE 24 HR ORAL DAILY
Refills: 0 | Status: DISCONTINUED | OUTPATIENT
Start: 2023-07-28 | End: 2023-08-11

## 2023-07-28 RX ORDER — ASPIRIN/CALCIUM CARB/MAGNESIUM 324 MG
81 TABLET ORAL DAILY
Refills: 0 | Status: DISCONTINUED | OUTPATIENT
Start: 2023-07-29 | End: 2023-08-11

## 2023-07-28 RX ORDER — CLOPIDOGREL 75 MG/1
1 TABLET, FILM COATED ORAL
Qty: 90 | Refills: 3 | DISCHARGE
Start: 2023-07-28 | End: 2024-07-21

## 2023-07-28 RX ORDER — TAMSULOSIN HYDROCHLORIDE 0.4 MG/1
0.4 CAPSULE ORAL AT BEDTIME
Refills: 0 | Status: DISCONTINUED | OUTPATIENT
Start: 2023-07-28 | End: 2023-08-11

## 2023-07-28 RX ORDER — FENTANYL CITRATE 50 UG/ML
25 INJECTION INTRAVENOUS ONCE
Refills: 0 | Status: DISCONTINUED | OUTPATIENT
Start: 2023-07-28 | End: 2023-07-28

## 2023-07-28 RX ORDER — LAMOTRIGINE 25 MG/1
150 TABLET, ORALLY DISINTEGRATING ORAL DAILY
Refills: 0 | Status: DISCONTINUED | OUTPATIENT
Start: 2023-07-28 | End: 2023-08-11

## 2023-07-28 RX ORDER — ASPIRIN/CALCIUM CARB/MAGNESIUM 324 MG
1 TABLET ORAL
Qty: 0 | Refills: 0 | DISCHARGE
Start: 2023-07-28

## 2023-07-28 RX ORDER — SODIUM CHLORIDE 9 MG/ML
250 INJECTION INTRAMUSCULAR; INTRAVENOUS; SUBCUTANEOUS ONCE
Refills: 0 | Status: COMPLETED | OUTPATIENT
Start: 2023-07-28 | End: 2023-07-28

## 2023-07-28 RX ORDER — SODIUM CHLORIDE 9 MG/ML
1000 INJECTION INTRAMUSCULAR; INTRAVENOUS; SUBCUTANEOUS
Refills: 0 | Status: DISCONTINUED | OUTPATIENT
Start: 2023-07-28 | End: 2023-08-11

## 2023-07-28 RX ORDER — RAMIPRIL 5 MG
1 CAPSULE ORAL
Qty: 0 | Refills: 0 | DISCHARGE

## 2023-07-28 RX ORDER — ESCITALOPRAM OXALATE 10 MG/1
1 TABLET, FILM COATED ORAL
Qty: 0 | Refills: 0 | DISCHARGE

## 2023-07-28 RX ORDER — CLOPIDOGREL BISULFATE 75 MG/1
1 TABLET, FILM COATED ORAL
Qty: 0 | Refills: 0 | DISCHARGE

## 2023-07-28 RX ORDER — ATORVASTATIN CALCIUM 80 MG/1
80 TABLET, FILM COATED ORAL AT BEDTIME
Refills: 0 | Status: DISCONTINUED | OUTPATIENT
Start: 2023-07-28 | End: 2023-08-11

## 2023-07-28 RX ORDER — CLOPIDOGREL BISULFATE 75 MG/1
1 TABLET, FILM COATED ORAL
Qty: 90 | Refills: 3
Start: 2023-07-28 | End: 2024-07-21

## 2023-07-28 RX ORDER — NIACIN 50 MG
1 TABLET ORAL
Qty: 0 | Refills: 0 | DISCHARGE

## 2023-07-28 RX ORDER — CLOPIDOGREL BISULFATE 75 MG/1
75 TABLET, FILM COATED ORAL DAILY
Refills: 0 | Status: DISCONTINUED | OUTPATIENT
Start: 2023-07-28 | End: 2023-08-11

## 2023-07-28 RX ORDER — METOPROLOL TARTRATE 50 MG
50 TABLET ORAL DAILY
Refills: 0 | Status: DISCONTINUED | OUTPATIENT
Start: 2023-07-28 | End: 2023-08-11

## 2023-07-28 RX ADMIN — SODIUM CHLORIDE 500 MILLILITER(S): 9 INJECTION INTRAMUSCULAR; INTRAVENOUS; SUBCUTANEOUS at 12:58

## 2023-07-28 RX ADMIN — FENTANYL CITRATE 25 MICROGRAM(S): 50 INJECTION INTRAVENOUS at 15:49

## 2023-07-28 RX ADMIN — SODIUM CHLORIDE 100 MILLILITER(S): 9 INJECTION INTRAMUSCULAR; INTRAVENOUS; SUBCUTANEOUS at 15:49

## 2023-07-28 RX ADMIN — FENTANYL CITRATE 25 MICROGRAM(S): 50 INJECTION INTRAVENOUS at 16:07

## 2023-07-28 RX ADMIN — SODIUM CHLORIDE 75 MILLILITER(S): 9 INJECTION INTRAMUSCULAR; INTRAVENOUS; SUBCUTANEOUS at 12:58

## 2023-07-28 NOTE — H&P CARDIOLOGY - HISTORY OF PRESENT ILLNESS
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 with Dr. Carrero (10 ALEX to LAD- 12 coronary stents total, last stent 2013), HTN, depression, BPH presents for [facility] for UA. Patient has been having CP with exertion over the last 6 months and HENDRIX similar to symptoms prior to previous stents. Referred to Dr. Camara for further ischemic evaluation.       EF 50% as per patient  Cardiologist Dr. 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 Carrero (10 ALEX to LAD- 12 coronary stents total, last stent 2013), HTN, depression, BPH presents for [facility] for UA. Patient has been having CP with exertion over the last 6 months and HENDRIX similar to symptoms prior to previous stents. Referred to Dr. Camara for further ischemic evaluation.       EF 50% as per patient  Cardiologist Dr. Demetrius Chavis    Patient with labs from 7/27/23  hgb 16.2, hct 46.7, plt 209, K 4.4, Na 143, BUN 20, Cr 0.82, glucose 107, eGFR 99

## 2023-07-28 NOTE — ASU DISCHARGE PLAN (ADULT/PEDIATRIC) - CARE PROVIDER_API CALL
Demetrius Chavis  Cardiovascular Disease  [address] [place] [address]  Phone: [phone]  Fax: [phone]  Established Patient  Follow Up Time: 2 weeks

## 2023-07-28 NOTE — ASU DISCHARGE PLAN (ADULT/PEDIATRIC) - PROCEDURE
s/p POBA to mid-distal LAD (70%) PCI with balloon angioplasty of diffuse restenosis of the mid to distal LAD

## 2023-07-28 NOTE — CHART NOTE - NSCHARTNOTEFT_GEN_A_CORE
Removal of Femoral Sheath    Pulses in the right lower extremity are palpable. The patient was placed in the supine position. The insertion site was identified and the sutures were removed per protocol.  The 6_ Czech femoral sheath was then removed. Direct pressure was applied for  __20_ minutes.     Monitoring of the right groin and both lower extremities including neuro-vascular checks and vital signs every 15 minutes x 4, then every 30 minutes x 2, then every 1 hour x 2 then q4 x 72 hours was ordered.    Complications: None    Comments: All reportable signs and symptoms educated to patient via teach back.

## 2023-07-28 NOTE — ASU DISCHARGE PLAN (ADULT/PEDIATRIC) - ASU DC SPECIAL INSTRUCTIONSFT
Wound Care:   the day AFTER your procedure remove bandage GENTLY, and clean using  mild soap and gentle warm, water stream, pat dry. leave OPEN to air. YOU MAY SHOWER   DO NOT apply lotions, creams, ointments, powder, perfumes to your incision site  DO NOT SOAK your site for 1 week ( no baths, no pools, no tubs, etc...)  Check  your groin and /or wrist daily. 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 operate heavy machineries   - you may resume sexual activity in 48 hours, unless otherwise instructed by your cardiologist     If your procedure was done through the WRIST: for the NEXT 3DAYS:  - avoid pushing, pulling, with that affected wrist   - avoid repeated movement of that hand and wrist ( eg: typing, hammering)  - DO NOT LIFT anything more than 5 lbs     If your procedure was done through the GROIN: for the NEXT 5 DAYS  - Limit climbing stairs, DO NOT soak in bathtub or pool  - no strenuous activities, pushing, pulling, straining  - Do not lift anything 10lbs or heavier     MEDICATION:   take your medications as explained ( see discharge paperwork)   If you received a STENT, you will be taking antiplatelet medications to KEEP YOUR STENT OPEN ( eg: Aspirin, Plavix, Brilinta, Effient, etc).  Take as prescribed DO NOT STOP taking them without consulting with your cardiologist first.     Follow heart healthy diet recommended by your doctor, if you smoke STOP SMOKING ( may call [phone] for center of tobacco control if you need assistance)     CALL your doctor to make appointment in 2 WEEKS     ***CALL YOUR DOCTOR***  if you experience: fever, chills, body aches, or severe pain, swelling, redness, heat or yellow discharge at incision site  If you experience bleeding or excruciating pain at the procedural site, swelling ( golf ball size) at your procedural site  If you experience CHEST PAIN  If you experience extremity numbness, tingling, temperature change ( of your procedural site)   If you are unable to reach your doctor, you may contact:   -Cardiology Office at [facility] at [phone] or   - [facility] [phone] Patient s/p successful PCI with balloon angioplasty of diffuse restenosis of the mid to distal LAD. Plan brachytherapy in 1-2 months     Wound Care:   the day AFTER your procedure remove bandage GENTLY, and clean using  mild soap and gentle warm, water stream, pat dry. leave OPEN to air. YOU MAY SHOWER   DO NOT apply lotions, creams, ointments, powder, perfumes to your incision site  DO NOT SOAK your site for 1 week ( no baths, no pools, no tubs, etc...)  Check  your groin and /or wrist daily. 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 operate heavy machineries   - you may resume sexual activity in 48 hours, unless otherwise instructed by your cardiologist     If your procedure was done through the WRIST: for the NEXT 3DAYS:  - avoid pushing, pulling, with that affected wrist   - avoid repeated movement of that hand and wrist ( eg: typing, hammering)  - DO NOT LIFT anything more than 5 lbs     If your procedure was done through the GROIN: for the NEXT 5 DAYS  - Limit climbing stairs, DO NOT soak in bathtub or pool  - no strenuous activities, pushing, pulling, straining  - Do not lift anything 10lbs or heavier     MEDICATION:   take your medications as explained ( see discharge paperwork)   If you received a STENT, you will be taking antiplatelet medications to KEEP YOUR STENT OPEN ( eg: Aspirin, Plavix, Brilinta, Effient, etc).  Take as prescribed DO NOT STOP taking them without consulting with your cardiologist first.     Follow heart healthy diet recommended by your doctor, if you smoke STOP SMOKING ( may call [phone] for center of tobacco control if you need assistance)     CALL your doctor to make appointment in 2 WEEKS     ***CALL YOUR DOCTOR***  if you experience: fever, chills, body aches, or severe pain, swelling, redness, heat or yellow discharge at incision site  If you experience bleeding or excruciating pain at the procedural site, swelling ( golf ball size) at your procedural site  If you experience CHEST PAIN  If you experience extremity numbness, tingling, temperature change ( of your procedural site)   If you are unable to reach your doctor, you may contact:   -Cardiology Office at [facility] at [phone] or   - [phone]

## 2023-09-02 ENCOUNTER — TRANSCRIPTION ENCOUNTER (OUTPATIENT)
Age: 63
End: 2023-09-02

## 2023-09-02 ENCOUNTER — INPATIENT (INPATIENT)
Facility: HOSPITAL | Age: 63
LOS: 0 days | Discharge: HOME | DRG: 313 | End: 2023-09-02
Attending: INTERNAL MEDICINE | Admitting: INTERNAL MEDICINE
Payer: COMMERCIAL

## 2023-09-02 VITALS
HEIGHT: 75 IN | DIASTOLIC BLOOD PRESSURE: 68 MMHG | HEART RATE: 73 BPM | OXYGEN SATURATION: 99 % | SYSTOLIC BLOOD PRESSURE: 132 MMHG | RESPIRATION RATE: 18 BRPM | TEMPERATURE: 98 F

## 2023-09-02 VITALS — TEMPERATURE: 98 F

## 2023-09-02 DIAGNOSIS — R07.9 CHEST PAIN, UNSPECIFIED: ICD-10-CM

## 2023-09-02 DIAGNOSIS — Z95.5 PRESENCE OF CORONARY ANGIOPLASTY IMPLANT AND GRAFT: Chronic | ICD-10-CM

## 2023-09-02 LAB
A1C WITH ESTIMATED AVERAGE GLUCOSE RESULT: 5.4 % — SIGNIFICANT CHANGE UP (ref 4–5.6)
ALBUMIN SERPL ELPH-MCNC: 3.6 G/DL — SIGNIFICANT CHANGE UP (ref 3.3–5)
ALP SERPL-CCNC: 86 U/L — SIGNIFICANT CHANGE UP (ref 40–120)
ALT FLD-CCNC: 34 U/L — SIGNIFICANT CHANGE UP (ref 12–78)
ANION GAP SERPL CALC-SCNC: 5 MMOL/L — SIGNIFICANT CHANGE UP (ref 5–17)
ANION GAP SERPL CALC-SCNC: 7 MMOL/L — SIGNIFICANT CHANGE UP (ref 5–17)
APTT BLD: 30.3 SEC — SIGNIFICANT CHANGE UP (ref 24.5–35.6)
AST SERPL-CCNC: 16 U/L — SIGNIFICANT CHANGE UP (ref 15–37)
BASOPHILS # BLD AUTO: 0.04 K/UL — SIGNIFICANT CHANGE UP (ref 0–0.2)
BASOPHILS NFR BLD AUTO: 0.4 % — SIGNIFICANT CHANGE UP (ref 0–2)
BILIRUB SERPL-MCNC: 0.7 MG/DL — SIGNIFICANT CHANGE UP (ref 0.2–1.2)
BUN SERPL-MCNC: 18 MG/DL — SIGNIFICANT CHANGE UP (ref 7–23)
BUN SERPL-MCNC: 19 MG/DL — SIGNIFICANT CHANGE UP (ref 7–23)
CALCIUM SERPL-MCNC: 9 MG/DL — SIGNIFICANT CHANGE UP (ref 8.5–10.1)
CALCIUM SERPL-MCNC: 9 MG/DL — SIGNIFICANT CHANGE UP (ref 8.5–10.1)
CHLORIDE SERPL-SCNC: 111 MMOL/L — HIGH (ref 96–108)
CHLORIDE SERPL-SCNC: 113 MMOL/L — HIGH (ref 96–108)
CHOLEST SERPL-MCNC: 123 MG/DL — SIGNIFICANT CHANGE UP
CO2 SERPL-SCNC: 23 MMOL/L — SIGNIFICANT CHANGE UP (ref 22–31)
CO2 SERPL-SCNC: 26 MMOL/L — SIGNIFICANT CHANGE UP (ref 22–31)
CREAT SERPL-MCNC: 0.72 MG/DL — SIGNIFICANT CHANGE UP (ref 0.5–1.3)
CREAT SERPL-MCNC: 0.85 MG/DL — SIGNIFICANT CHANGE UP (ref 0.5–1.3)
EGFR: 103 ML/MIN/1.73M2 — SIGNIFICANT CHANGE UP
EGFR: 98 ML/MIN/1.73M2 — SIGNIFICANT CHANGE UP
EOSINOPHIL # BLD AUTO: 0.11 K/UL — SIGNIFICANT CHANGE UP (ref 0–0.5)
EOSINOPHIL NFR BLD AUTO: 1.2 % — SIGNIFICANT CHANGE UP (ref 0–6)
ESTIMATED AVERAGE GLUCOSE: 108 MG/DL — SIGNIFICANT CHANGE UP (ref 68–114)
GLUCOSE SERPL-MCNC: 113 MG/DL — HIGH (ref 70–99)
GLUCOSE SERPL-MCNC: 137 MG/DL — HIGH (ref 70–99)
HCT VFR BLD CALC: 42.6 % — SIGNIFICANT CHANGE UP (ref 39–50)
HCT VFR BLD CALC: 42.6 % — SIGNIFICANT CHANGE UP (ref 39–50)
HCV AB S/CO SERPL IA: 0.06 S/CO — SIGNIFICANT CHANGE UP (ref 0–0.99)
HCV AB SERPL-IMP: SIGNIFICANT CHANGE UP
HDLC SERPL-MCNC: 33 MG/DL — LOW
HGB BLD-MCNC: 14.9 G/DL — SIGNIFICANT CHANGE UP (ref 13–17)
HGB BLD-MCNC: 15.6 G/DL — SIGNIFICANT CHANGE UP (ref 13–17)
IMM GRANULOCYTES NFR BLD AUTO: 0.2 % — SIGNIFICANT CHANGE UP (ref 0–0.9)
INR BLD: 1 RATIO — SIGNIFICANT CHANGE UP (ref 0.85–1.18)
LIDOCAIN IGE QN: 31 U/L — SIGNIFICANT CHANGE UP (ref 13–75)
LIPID PNL WITH DIRECT LDL SERPL: 53 MG/DL — SIGNIFICANT CHANGE UP
LYMPHOCYTES # BLD AUTO: 1.74 K/UL — SIGNIFICANT CHANGE UP (ref 1–3.3)
LYMPHOCYTES # BLD AUTO: 19 % — SIGNIFICANT CHANGE UP (ref 13–44)
MAGNESIUM SERPL-MCNC: 2 MG/DL — SIGNIFICANT CHANGE UP (ref 1.6–2.6)
MAGNESIUM SERPL-MCNC: 2.3 MG/DL — SIGNIFICANT CHANGE UP (ref 1.6–2.6)
MCHC RBC-ENTMCNC: 31.8 PG — SIGNIFICANT CHANGE UP (ref 27–34)
MCHC RBC-ENTMCNC: 32.4 PG — SIGNIFICANT CHANGE UP (ref 27–34)
MCHC RBC-ENTMCNC: 35 GM/DL — SIGNIFICANT CHANGE UP (ref 32–36)
MCHC RBC-ENTMCNC: 36.6 GM/DL — HIGH (ref 32–36)
MCV RBC AUTO: 88.6 FL — SIGNIFICANT CHANGE UP (ref 80–100)
MCV RBC AUTO: 90.8 FL — SIGNIFICANT CHANGE UP (ref 80–100)
MONOCYTES # BLD AUTO: 0.59 K/UL — SIGNIFICANT CHANGE UP (ref 0–0.9)
MONOCYTES NFR BLD AUTO: 6.5 % — SIGNIFICANT CHANGE UP (ref 2–14)
NEUTROPHILS # BLD AUTO: 6.64 K/UL — SIGNIFICANT CHANGE UP (ref 1.8–7.4)
NEUTROPHILS NFR BLD AUTO: 72.7 % — SIGNIFICANT CHANGE UP (ref 43–77)
NON HDL CHOLESTEROL: 89 MG/DL — SIGNIFICANT CHANGE UP
NT-PROBNP SERPL-SCNC: 69 PG/ML — SIGNIFICANT CHANGE UP (ref 0–125)
PHOSPHATE SERPL-MCNC: 3.9 MG/DL — SIGNIFICANT CHANGE UP (ref 2.5–4.5)
PLATELET # BLD AUTO: 188 K/UL — SIGNIFICANT CHANGE UP (ref 150–400)
PLATELET # BLD AUTO: 198 K/UL — SIGNIFICANT CHANGE UP (ref 150–400)
POTASSIUM SERPL-MCNC: 3.1 MMOL/L — LOW (ref 3.5–5.3)
POTASSIUM SERPL-MCNC: 3.9 MMOL/L — SIGNIFICANT CHANGE UP (ref 3.5–5.3)
POTASSIUM SERPL-SCNC: 3.1 MMOL/L — LOW (ref 3.5–5.3)
POTASSIUM SERPL-SCNC: 3.9 MMOL/L — SIGNIFICANT CHANGE UP (ref 3.5–5.3)
PROT SERPL-MCNC: 7.1 GM/DL — SIGNIFICANT CHANGE UP (ref 6–8.3)
PROTHROM AB SERPL-ACNC: 11.3 SEC — SIGNIFICANT CHANGE UP (ref 9.5–13)
RBC # BLD: 4.69 M/UL — SIGNIFICANT CHANGE UP (ref 4.2–5.8)
RBC # BLD: 4.81 M/UL — SIGNIFICANT CHANGE UP (ref 4.2–5.8)
RBC # FLD: 12.1 % — SIGNIFICANT CHANGE UP (ref 10.3–14.5)
RBC # FLD: 12.1 % — SIGNIFICANT CHANGE UP (ref 10.3–14.5)
SODIUM SERPL-SCNC: 141 MMOL/L — SIGNIFICANT CHANGE UP (ref 135–145)
SODIUM SERPL-SCNC: 144 MMOL/L — SIGNIFICANT CHANGE UP (ref 135–145)
TRIGL SERPL-MCNC: 224 MG/DL — HIGH
TROPONIN I, HIGH SENSITIVITY RESULT: 118.1 NG/L — HIGH
TROPONIN I, HIGH SENSITIVITY RESULT: 190.82 NG/L — HIGH
WBC # BLD: 7.4 K/UL — SIGNIFICANT CHANGE UP (ref 3.8–10.5)
WBC # BLD: 9.14 K/UL — SIGNIFICANT CHANGE UP (ref 3.8–10.5)
WBC # FLD AUTO: 7.4 K/UL — SIGNIFICANT CHANGE UP (ref 3.8–10.5)
WBC # FLD AUTO: 9.14 K/UL — SIGNIFICANT CHANGE UP (ref 3.8–10.5)

## 2023-09-02 PROCEDURE — 80061 LIPID PANEL: CPT

## 2023-09-02 PROCEDURE — 36415 COLL VENOUS BLD VENIPUNCTURE: CPT

## 2023-09-02 PROCEDURE — 93005 ELECTROCARDIOGRAM TRACING: CPT

## 2023-09-02 PROCEDURE — 99285 EMERGENCY DEPT VISIT HI MDM: CPT

## 2023-09-02 PROCEDURE — 84100 ASSAY OF PHOSPHORUS: CPT

## 2023-09-02 PROCEDURE — 86803 HEPATITIS C AB TEST: CPT

## 2023-09-02 PROCEDURE — 84484 ASSAY OF TROPONIN QUANT: CPT

## 2023-09-02 PROCEDURE — 93010 ELECTROCARDIOGRAM REPORT: CPT

## 2023-09-02 PROCEDURE — 71045 X-RAY EXAM CHEST 1 VIEW: CPT | Mod: 26

## 2023-09-02 PROCEDURE — 93306 TTE W/DOPPLER COMPLETE: CPT | Mod: 26

## 2023-09-02 PROCEDURE — 83036 HEMOGLOBIN GLYCOSYLATED A1C: CPT

## 2023-09-02 PROCEDURE — 83735 ASSAY OF MAGNESIUM: CPT

## 2023-09-02 PROCEDURE — 99239 HOSP IP/OBS DSCHRG MGMT >30: CPT

## 2023-09-02 PROCEDURE — 85027 COMPLETE CBC AUTOMATED: CPT

## 2023-09-02 PROCEDURE — 93306 TTE W/DOPPLER COMPLETE: CPT

## 2023-09-02 PROCEDURE — 80048 BASIC METABOLIC PNL TOTAL CA: CPT

## 2023-09-02 RX ORDER — NITROGLYCERIN 6.5 MG
1 CAPSULE, EXTENDED RELEASE ORAL
Qty: 1 | Refills: 0
Start: 2023-09-02

## 2023-09-02 RX ORDER — INFLUENZA VIRUS VACCINE 15; 15; 15; 15 UG/.5ML; UG/.5ML; UG/.5ML; UG/.5ML
0.5 SUSPENSION INTRAMUSCULAR ONCE
Refills: 0 | Status: DISCONTINUED | OUTPATIENT
Start: 2023-09-02 | End: 2023-09-02

## 2023-09-02 RX ORDER — POTASSIUM CHLORIDE 20 MEQ
40 PACKET (EA) ORAL ONCE
Refills: 0 | Status: COMPLETED | OUTPATIENT
Start: 2023-09-02 | End: 2023-09-02

## 2023-09-02 RX ORDER — LAMOTRIGINE 25 MG/1
1 TABLET, ORALLY DISINTEGRATING ORAL
Refills: 0 | DISCHARGE

## 2023-09-02 RX ORDER — PANTOPRAZOLE SODIUM 20 MG/1
40 TABLET, DELAYED RELEASE ORAL DAILY
Refills: 0 | Status: DISCONTINUED | OUTPATIENT
Start: 2023-09-02 | End: 2023-09-02

## 2023-09-02 RX ORDER — CHLORHEXIDINE GLUCONATE 213 G/1000ML
1 SOLUTION TOPICAL
Refills: 0 | Status: DISCONTINUED | OUTPATIENT
Start: 2023-09-02 | End: 2023-09-02

## 2023-09-02 RX ADMIN — Medication 40 MILLIEQUIVALENT(S): at 01:14

## 2023-09-02 NOTE — ED ADULT TRIAGE NOTE - INTERNATIONAL TRAVEL
Group Topic: BH Process Group     Date: 12/12/2019  Start Time: 11:00 AM  End Time: 12:00 PM  Facilitators: Varun Lentz LPC; Cirilo Escalera    Focus: Recovery  Number in attendance: 10  Patients were given the opportunity to share individually about goals, current stressors and therapeutic assignments. Group and therapist feedback is welcomed and maladaptive skills are challenged with coping options. Attendance: Present  Patient Response: Appropriate feedback, Attentive, Good eye contact and Interested in topic  Mood: Depressed  Behavior/Socialization: Appropriate to group and Engaged  Thought Process: Ruminating and Tangential     Patient discussed how he is working on his physical therapy in hope to return to his job. 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 Pt identified that he is progressing physically and how his physical being impacts his mental being. Pt shared a list of things that he enjoys to do but cannot do to his physical state. Licensed Provider Directing Treatment: Juanita Polanco [facility]    Documentation Completed By (Under Supervision of Licensed Provider): [address] Intern    I was present and agree with the content of the note.      Juanita Polanco [facility] No

## 2023-09-02 NOTE — DISCHARGE NOTE PROVIDER - HOSPITAL COURSE
64 y/o male with history of CAD, stents, instent stenosis of LAD, presented with chest pain at rest which relieved by nitroglycerine.   EKG without acute STEMI  Trop slightly elevated but no NSTEMI   CP resolved  Clinically stable   TTE, EKG and labs reviewed with cardiology - recommend outpatient cardiac intervention as previously scheduled   Rx for prn sublingual nitroglycerine sent to his pharmacy

## 2023-09-02 NOTE — ED PROVIDER NOTE - CLINICAL SUMMARY MEDICAL DECISION MAKING FREE TEXT BOX
64 yo with sig CAD hx, with chest pain concerning for acs. Plan for ekg, cxr, labs, and consult with cardiology.

## 2023-09-02 NOTE — PROVIDER CONTACT NOTE (EICU) - BACKGROUND
63M with prior hx of extensive CAD with multiple PCI w/ prior in- stent occlusion p/w chest pain Admitted to ICU for NSTEMI. Elevated troponin w/o EKG changes.

## 2023-09-02 NOTE — PATIENT PROFILE ADULT - FALL HARM RISK - UNIVERSAL INTERVENTIONS
Bed in lowest position, wheels locked, appropriate side rails in place/Call bell, personal items and telephone in reach/Instruct patient to call for assistance before getting out of bed or chair/Non-slip footwear when patient is out of bed/[place] to call system/Physically safe environment - no spills, clutter or unnecessary equipment/Purposeful Proactive Rounding/Room/bathroom lighting operational, light cord in reach

## 2023-09-02 NOTE — DISCHARGE NOTE PROVIDER - NSDCMRMEDTOKEN_GEN_ALL_CORE_FT
ALPRAZolam 1 mg oral tablet: 1 tab(s) orally 3 times a day as needed for  anxiety  aspirin 81 mg oral delayed release tablet: 1 tab(s) orally once a day  clonazePAM 1 mg oral tablet: 1 tab(s) orally in the morning and at bedtime as needed for  Effexor  mg oral capsule, extended release: 1 cap(s) orally once a day  lamoTRIgine 200 mg oral tablet: 1 tab(s) orally once a day  Lipitor 80 mg oral tablet: 1 tab(s) orally once a day  metoprolol succinate 50 mg oral tablet, extended release: 1 tab(s) orally once a day  nitroglycerin 0.4 mg sublingual tablet: 1 tab(s) sublingually every 5 minutes as needed for  chest pain every 15 minutes (maximum 3 doses) as needed for chest pain  Plavix 75 mg oral tablet: 1 tab(s) orally once a day  tamsulosin 0.4 mg oral capsule: 1 cap(s) orally once a day

## 2023-09-02 NOTE — DISCHARGE NOTE PROVIDER - CARE PROVIDER_API CALL
Ezequiel Shields  Pulmonary Disease  [address]  [place] [address]  Phone: [phone]  Fax: [phone]  Established Patient  Follow Up Time: 1 week    Chet Camara  Cardiovascular Disease  [address] [place] [address]  Phone: [phone]  Fax: [phone]  Established Patient  Follow Up Time: 1 month    Demetrius Chavis  Cardiovascular Disease  [address] [place] [address]  Phone: [phone]  Fax: [phone]  Established Patient  Follow Up Time: 1 week

## 2023-09-02 NOTE — DISCHARGE NOTE PROVIDER - CARE PROVIDERS DIRECT ADDRESSES
[email or web address]

## 2023-09-02 NOTE — ED PROVIDER NOTE - PRIOR EKG STATUS
7/28/23/the EKG is unchanged from prior EKG 7/28/23, more downward st in lead II/the EKG is unchanged from prior EKG

## 2023-09-02 NOTE — ED ADULT NURSE NOTE - OBJECTIVE STATEMENT
pt BIBEMS from home c/o chest pressure. Pt has h/o hx CAD, 11 stents, and a recent cath on 7/28/23. Pt was given 1mg nitro PTA as per EMS, chest pain improved. Pt currently denying chest pressure/pain upon arrival, denies SOB and any other complaints. Pt VSS, resting in bed comfrotably with call bell within reach. Pt placed on cardiac monitor with continuous pulse ox.

## 2023-09-02 NOTE — DISCHARGE NOTE PROVIDER - PROVIDER TOKENS
PROVIDER:[TOKEN:[[id number]],FOLLOWUP:[1 week],ESTABLISHEDPATIENT:[T]],PROVIDER:[TOKEN:[[id number]],FOLLOWUP:[1 month],ESTABLISHEDPATIENT:[T]],PROVIDER:[TOKEN:[[id number]],FOLLOWUP:[1 week],ESTABLISHEDPATIENT:[T]]

## 2023-09-02 NOTE — ED ADULT TRIAGE NOTE - NS ED NURSE BANDS TYPE
Nutrition Assessment   Assessment Type: Follow up  Reason for Visit: Registered Dietitian Evaluation  Chart Medications Lab Results Reviewed:  yes   Nutritional Risk Factors: Enteral nutrition and High risk diagnosis      Current Diet Order: Osmolite 1.2 @ 60mL/hr   Food Allergies: no  Priority Points: Status 2     Demographic/Anthropometrics Information  Gender: female   Patient Age: 84 year old  Height:        Ht Readings from Last 1 Encounters:   05/01/20 5' 5\" (1.651 m)   Weight:       Wt Readings from Last 1 Encounters:   05/09/20 65.6 kg   BMI:       BMI Readings from Last 1 Encounters:   05/09/20 24.07 kg/m²   Usual Body Weight: 61.7 kg per EMR on 3/22/20  Reason for Weight Change: Fluid retention  Weight Classification: Normal weight (BMI 18.5-24.9)     Estimated Nutritional Needs  Assessment Weight: 61.7 kg (initial admit to OSH wt)  Energy Needs: 25-30 kcal/kg   0523-5497 kcal/day  Protein Needs: 1.2-1.5 g/kg  74-93 grams/day  Fluid Needs: 25-30 mL/kg  7921-1231 mL/day     Nutrition Diagnosis (PES)  Inadequate oral intake related to Inability to tolerate as evidenced by Need for NPO status     Nutrition Plan  Recommended Nutrition Intervention: Coordination of nutrition care by a nutrition professional, enteral nutrition and parenteral nutrition  Monitor: Biochemical data, medical tests, procedures, Weight and Food and nutrient administration   Discharge Needs: Pending  Care Plan Discussed With: Other: physician assistant  Goals: Tolerate enteral feeding goal  Goal Progress: met       Dietitian Notes/Impressions/Recommendations:  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 S/p multiple procedures and stent placement. Stent removed, s/p esophagostomy, ex lap with jejunostomy tube placement.     TF is running at goal rate.     J-tube feeds- Osmolite 1.2 60 ml/hr, provides 1728 kcals, 80 gm protein, 1181 mL free water. Meets est needs.    30 ml FWF q 3 hours provides additional  240 ml free water daily.       TREATMENT PLAN: Monitoring & Interventions  1. Continue present TF regimen. MD concerned about possible dehydration. May increase the H2O flushes to 65 ml q.3 hours. Total fluids with flushes and free H2O from the TFs would be 1701 ml.     2. RD to monitor intake, weight, labs. Further recommendations based on clinical course.    RD to follow.    dEel Pryor RD, LDN  Clinical Dietitian  Contact via Epic Secure chat / Nutrition Consult       Name band;

## 2023-09-02 NOTE — ED PROVIDER NOTE - PROGRESS NOTE DETAILS
d/w dr welsh, on for dr garcia, recommend ccu, trend trops, no heparin for now unless or trop trends up. MD EDIE

## 2023-09-02 NOTE — ED ADULT NURSE NOTE - CHIEF COMPLAINT QUOTE
pt BIBEMS from home c/o chest pressure. prenotif from MD Shields, hx of CAD, 12 stents, cath x3 weeks ago. given 1mg nitro PTA, chest pain improved. L AC 18g placed PTA. Endorses start of chest discomfort approx 45 min. EKG in progress

## 2023-09-02 NOTE — H&P ADULT - ASSESSMENT
64 yo M BIBEMS from home with PMHx of HTN, CAD with 11 stents, recent cath 7/28/23 with LAD stent occluded, was ballooned, scheudled for brachytherapy end of month, with onset at 10pm of left sided shoulder pain and squeezing in his left chest.     Assessment     1) NSTEMI  2) Hypokalemia     Plan    Hemodynamically stable  Angina free s/p 1 nitro and 325 ASA, no indication for nitro gtt at this time  EKG shows no changes compared to 7/28  Previous echo showing preserved LV function   Elevated Trop. Trending Q6hr  Recent cath that showed 90% instent stenosis in LAD and 70% in OM-2   Planned brachy therapy for instent stenosis in 1-2 months   Cardiology consulted, Chyna involved.   K- 3.1. Given 40 mEq potassium. Will trend.   TTE ordered  Lipid profile in AM  HgA1c Ordered   Labs in AM  Will reorder home medications     Discussed with eICU    Time spent on this patient encounter, which includes documenting this note in the electronic medical record, was 75 minutes including assessing the presenting problems with associated risks, reviewing the medical record to prepare for the encounter, and meeting face to face with patient to obtain additional history. I have also performed an appropriate physical exam, made interventions listed and ordered and interpreted appropriate diagnostic studies as documented. To improve communication and patient saftey, I have coordinated care with the multidisciplinary team including the bedside nurse, appropriate attending of record and consultants as needed. "

## 2023-09-02 NOTE — DISCHARGE NOTE NURSING/CASE MANAGEMENT/SOCIAL WORK - PATIENT PORTAL LINK FT
You can access the FollowMyHealth Patient Portal offered by [facility] by registering at the following website: [email or web address]. By joining [email or web address]’s FollowMyHealth portal, you will also be able to view your health information using other applications (apps) compatible with our system.

## 2023-09-02 NOTE — CONSULT NOTE ADULT - SUBJECTIVE AND OBJECTIVE BOX
Patient is a 63y old  Male who presents with a chief complaint of NSTEMI (02 Sep 2023 01:24)      HPI:  62 yo M BIBEMS from home with PMHx of HTN, CAD with 11 stents, recent cath 7/28/23 with LAD stent occluded, was ballooned, scheudled for brachytherapy end of month, with onset at 10pm of left sided shoulder pain and squeezing in his left chest. Had pizza earlier, thought maybe it was GERD. However, got worse. Took 325 asa PTA, called EMS. EMS gave pt 1 nitroglycerin and currently he is pain free. PMD Vomero Cardio Papaleo. Pt daily meds are 81 asa, plavix, metoprolol, effexor, lipitor, lamictal. Non smoker. No fever, chills. No cough, sob. No calf pain or swelling. CCU consulted for NSTEMI. Patient examine. Currently asymptomatic.  (02 Sep 2023 01:24)  S/P PCI of ISR in mLAD  Awaiting betaradiation for ISR  Mildly elevated Troponins    PAST MEDICAL & SURGICAL HISTORY:  CAD (coronary artery disease)      HTN (hypertension)      H/O: depression      STEMI (ST elevation myocardial infarction)  at 47 y/o      History of coronary artery stent placement          HPI:                PREVIOUS DIAGNOSTIC TESTING:      ECHO  FINDINGS:    STRESS  FINDINGS:    CATHETERIZATION  FINDINGS:    MEDICATIONS  (STANDING):  chlorhexidine 2% Cloths 1 Application(s) Topical <User Schedule>  influenza   Vaccine 0.5 milliLiter(s) IntraMuscular once  pantoprazole  Injectable 40 milliGRAM(s) IV Push daily    MEDICATIONS  (PRN):      FAMILY HISTORY:  Family history of CABG (Father)        SOCIAL HISTORY:    CIGARETTES:    ALCOHOL:    REVIEW OF SYSTEMS:  CONSTITUTIONAL:  No night sweats.  No fatigue, malaise, lethargy.  No fever or chills.  HEENT:  Eyes:  No visual changes.  No eye pain.  No eye discharge.    ENT:  No runny nose.  No epistaxis.  No sinus pain.  No sore throat.  No odynophagia.  No ear pain.  No congestion.  RESPIRATORY:  No cough.  No wheeze.  No hemoptysis.  No shortness of breath.  CARDIOVASCULAR:  No chest pains.  No palpitations. No shortness of breath, orthopnea or PND.  GASTROINTESTINAL:  No abdominal pain.  No nausea or vomiting.  No diarrhea or constipation.  No hematemesis.  No hematochezia.  No melena.  GENITOURINARY:  No urgency.  No frequency.  No dysuria.  No hematuria.  No obstructive symptoms.  No discharge.  No pain.  No significant abnormal bleeding.  MUSCULOSKELETAL:  No musculoskeletal pain.  No joint swelling.  No arthritis.  NEUROLOGICAL:  No tingling or numbness or weakness.  PSYCHIATRIC:  No confusion  SKIN:  No rashes.  No lesions.  No wounds.  ENDOCRINE:  No unexplained weight loss.  No polydipsia.  No polyuria.  No polyphagia.  HEMATOLOGIC:  No anemia.  No purpura.  No petechiae.  No prolonged or excessive bleeding.   ALLERGIC AND IMMUNOLOGIC:  No pruritus.  No swelling.         Vital Signs Last 24 Hrs  T(C): 36.1 (02 Sep 2023 02:20), Max: 36.9 (02 Sep 2023 01:41)  T(F): 97 (02 Sep 2023 02:20), Max: 98.5 (02 Sep 2023 01:41)  HR: 68 (02 Sep 2023 06:00) (67 - 88)  BP: 108/62 (02 Sep 2023 06:00) (102/58 - 132/68)  BP(mean): 76 (02 Sep 2023 06:00) (71 - 103)  RR: 16 (02 Sep 2023 06:00) (12 - 19)  SpO2: 96% (02 Sep 2023 06:00) (93% - 99%)    Parameters below as of 02 Sep 2023 02:20  Patient On (Oxygen Delivery Method): room air        PHYSICAL EXAM-    Constitutional: The patient appears to be normal, well developed, well nourished and alert and oriented to time, place and person. The patient does not appear acutely ill. The patient is alert.     Head: Head is normocephalic and atraumatic.      Neck: The patient's neck is supple without enlargement, has no palpable thyromegaly nor thyroid nodules and has no jugular venous distention. No audible carotid bruits. There are strong carotid pulses bilaterally. No JVD.     Cardiovascular: Regular rate and rhythm without S3, S4. No murmurs or rubs are appreciated.      Respiratory: The breath sounds in the left lung field are diminished through out. The breath sounds in the right lung field are diminished through out. The patient has no rales and no rhonchi. The patient has no wheezes.     Abdomen: Soft, nontender, nondistended with positive bowel sounds.      Extremity: No tenderness. There is no pitting edema, skin discoloration, clubbing and cyanosis.     Neurologic: The patient is alert and oriented.      Skin: No rash, no obvious lesions noted.      Psychiatric: The patient appears to be emotionally stable.      INTERPRETATION OF TELEMETRY:    ECG:    I&O's Detail    01 Sep 2023 07:01  -  02 Sep 2023 07:00  --------------------------------------------------------  IN:  Total IN: 0 mL    OUT:    Voided (mL): 300 mL  Total OUT: 300 mL    Total NET: -300 mL          LABS:                        14.9   7.40  )-----------( 188      ( 02 Sep 2023 07:18 )             42.6     09-02    144  |  113<H>  |  19  ----------------------------<  113<H>  3.9   |  26  |  0.72    Ca    9.0      02 Sep 2023 07:18  Phos  3.9     09-02  Mg     2.3     09-02    TPro  7.1  /  Alb  3.6  /  TBili  0.7  /  DBili  x   /  AST  16  /  ALT  34  /  AlkPhos  86  09-02        PT/INR - ( 02 Sep 2023 00:20 )   PT: 11.3 sec;   INR: 1.00 ratio         PTT - ( 02 Sep 2023 00:20 )  PTT:30.3 sec  Urinalysis Basic - ( 02 Sep 2023 07:18 )    Color: x / Appearance: x / SG: x / pH: x  Gluc: 113 mg/dL / Ketone: x  / Bili: x / Urobili: x   Blood: x / Protein: x / Nitrite: x   Leuk Esterase: x / RBC: x / WBC x   Sq Epi: x / Non Sq Epi: x / Bacteria: x      I&O's Summary    01 Sep 2023 07:01  -  02 Sep 2023 07:00  --------------------------------------------------------  IN: 0 mL / OUT: 300 mL / NET: -300 mL      BNP  RADIOLOGY & ADDITIONAL STUDIES:

## 2023-09-02 NOTE — CONSULT NOTE ADULT - ASSESSMENT
63 year old male with extensive CAD  presenting with some chest discomfort  Mildly elevated troponins  S/P PTCA few weeks ago on setting of ISR in mLAD  Awaiting for beta radiation  Stable at present  Asymptomatic  Ok with DC home  S/L nitro to carry  Continue current rx

## 2023-09-02 NOTE — H&P ADULT - NSHPPHYSICALEXAM_GEN_ALL_CORE
CONSTITUTIONAL: Well groomed, no apparent distress  EYES: PERRLA and symmetric, EOMI, No conjunctival or scleral injection, non-icteric  ENMT: Oral mucosa with moist membranes.   NECK: Supple, symmetric and without tracheal deviation   RESP: No respiratory distress, no use of accessory muscles; CTA b/l, no WRR  CV: RRR, +S1S2,   GI: Soft, NT, ND, no rebound, no guarding  SKIN: No rashes or ulcers noted;  NEURO: NFD. A&Ox3

## 2023-09-02 NOTE — ED ADULT NURSE NOTE - NSFALLUNIVINTERV_ED_ALL_ED
Bed/Stretcher in lowest position, wheels locked, appropriate side rails in place/Call bell, personal items and telephone in reach/Instruct patient to call for assistance before getting out of bed/chair/stretcher/Non-slip footwear applied when patient is off stretcher/[place] to call system/Physically safe environment - no spills, clutter or unnecessary equipment/Purposeful proactive rounding/Room/bathroom lighting operational, light cord in reach

## 2023-09-02 NOTE — H&P ADULT - HISTORY OF PRESENT ILLNESS
64 yo M BIBEMS from home with PMHx of HTN, CAD with 11 stents, recent cath 7/28/23 with LAD stent occluded, was ballooned, scheudled for brachytherapy end of month, with onset at 10pm of left sided shoulder pain and squeezing in his left chest. Had pizza earlier, thought maybe it was GERD. However, got worse. Took 325 asa PTA, called EMS. EMS gave pt 1 nitroglycerin and currently he is pain free. PMD [facility] Cardio Partha. Pt daily meds are 81 asa, plavix, metoprolol, effexor, lipitor, lamictal. Non smoker. No fever, chills. No cough, sob. No calf pain or swelling. CCU consulted for NSTEMI. Patient examine. Currently asymptomatic.

## 2023-09-02 NOTE — PROVIDER CONTACT NOTE (EICU) - RECOMMENDATIONS
c/w Anti-platelet therapy  Trend cardiac enzymes  Cardiology aware  Telemetry  Trend EKG.  Currently symptom free following nitro administration.

## 2023-09-02 NOTE — ED PROVIDER NOTE - OBJECTIVE STATEMENT
64 yo M BIBEMS from home with PMHx of HTN, CAD with 11 stents, recent cath 7/28/23 with LAD stent occluded, was ballooned, scheudled for brachytherapy end of month, with onset at 10pm of left sided shoulder pain and squeezing in his left chest. Had pizza earlier, thought maybe it was GERD. However, got worse. Took 325 asa PTA, called EMS. EMS gave pt 1 nitroglycerin and  no he is pain free. PMD Cornelius Cardio Papgiovanna Pt daily meds are 81 asa, plavix, metoprolol, effexor, lipitor, lamictal. Non smoker. No fever, chills. No cough, sob. No calf pain or swelling.

## 2023-09-04 ENCOUNTER — TRANSCRIPTION ENCOUNTER (OUTPATIENT)
Age: 63
End: 2023-09-04

## 2023-09-05 ENCOUNTER — TRANSCRIPTION ENCOUNTER (OUTPATIENT)
Age: 63
End: 2023-09-05

## 2023-09-07 ENCOUNTER — NON-APPOINTMENT (OUTPATIENT)
Age: 63
End: 2023-09-07

## 2023-09-07 DIAGNOSIS — U07.1 COVID-19: ICD-10-CM

## 2023-09-07 LAB
M TB IFN-G BLD-IMP: NEGATIVE
QUANTIFERON TB PLUS MITOGEN MINUS NIL: 9.39 IU/ML
QUANTIFERON TB PLUS NIL: 0.02 IU/ML
QUANTIFERON TB PLUS TB1 MINUS NIL: 0 IU/ML
QUANTIFERON TB PLUS TB2 MINUS NIL: 0 IU/ML

## 2023-09-11 DIAGNOSIS — I10 ESSENTIAL (PRIMARY) HYPERTENSION: ICD-10-CM

## 2023-09-11 DIAGNOSIS — R07.9 CHEST PAIN, UNSPECIFIED: ICD-10-CM

## 2023-09-11 DIAGNOSIS — I25.10 ATHEROSCLEROTIC HEART DISEASE OF NATIVE CORONARY ARTERY WITHOUT ANGINA PECTORIS: ICD-10-CM

## 2023-09-11 DIAGNOSIS — I25.2 OLD MYOCARDIAL INFARCTION: ICD-10-CM

## 2023-09-25 ENCOUNTER — TRANSCRIPTION ENCOUNTER (OUTPATIENT)
Age: 63
End: 2023-09-25

## 2023-09-27 ENCOUNTER — OUTPATIENT (OUTPATIENT)
Dept: OUTPATIENT SERVICES | Facility: HOSPITAL | Age: 63
LOS: 1 days | End: 2023-09-27
Payer: COMMERCIAL

## 2023-09-27 ENCOUNTER — TRANSCRIPTION ENCOUNTER (OUTPATIENT)
Age: 63
End: 2023-09-27

## 2023-09-27 VITALS
HEIGHT: 75 IN | OXYGEN SATURATION: 100 % | DIASTOLIC BLOOD PRESSURE: 96 MMHG | TEMPERATURE: 98 F | WEIGHT: 240.08 LBS | RESPIRATION RATE: 16 BRPM | HEART RATE: 80 BPM | SYSTOLIC BLOOD PRESSURE: 140 MMHG

## 2023-09-27 VITALS
HEART RATE: 77 BPM | OXYGEN SATURATION: 95 % | TEMPERATURE: 98 F | RESPIRATION RATE: 18 BRPM | SYSTOLIC BLOOD PRESSURE: 118 MMHG | DIASTOLIC BLOOD PRESSURE: 64 MMHG

## 2023-09-27 DIAGNOSIS — I25.10 ATHEROSCLEROTIC HEART DISEASE OF NATIVE CORONARY ARTERY WITHOUT ANGINA PECTORIS: ICD-10-CM

## 2023-09-27 DIAGNOSIS — Z95.5 PRESENCE OF CORONARY ANGIOPLASTY IMPLANT AND GRAFT: Chronic | ICD-10-CM

## 2023-09-27 LAB
ANION GAP SERPL CALC-SCNC: 11 MMOL/L — SIGNIFICANT CHANGE UP (ref 5–17)
BUN SERPL-MCNC: 16 MG/DL — SIGNIFICANT CHANGE UP (ref 7–23)
CALCIUM SERPL-MCNC: 9 MG/DL — SIGNIFICANT CHANGE UP (ref 8.4–10.5)
CHLORIDE SERPL-SCNC: 104 MMOL/L — SIGNIFICANT CHANGE UP (ref 96–108)
CO2 SERPL-SCNC: 26 MMOL/L — SIGNIFICANT CHANGE UP (ref 22–31)
CREAT SERPL-MCNC: 0.75 MG/DL — SIGNIFICANT CHANGE UP (ref 0.5–1.3)
EGFR: 102 ML/MIN/1.73M2 — SIGNIFICANT CHANGE UP
GLUCOSE SERPL-MCNC: 113 MG/DL — HIGH (ref 70–99)
HCT VFR BLD CALC: 44.3 % — SIGNIFICANT CHANGE UP (ref 39–50)
HGB BLD-MCNC: 15.5 G/DL — SIGNIFICANT CHANGE UP (ref 13–17)
MCHC RBC-ENTMCNC: 32 PG — SIGNIFICANT CHANGE UP (ref 27–34)
MCHC RBC-ENTMCNC: 35 GM/DL — SIGNIFICANT CHANGE UP (ref 32–36)
MCV RBC AUTO: 91.5 FL — SIGNIFICANT CHANGE UP (ref 80–100)
NRBC # BLD: 0 /100 WBCS — SIGNIFICANT CHANGE UP (ref 0–0)
PLATELET # BLD AUTO: 194 K/UL — SIGNIFICANT CHANGE UP (ref 150–400)
POTASSIUM SERPL-MCNC: 4.2 MMOL/L — SIGNIFICANT CHANGE UP (ref 3.5–5.3)
POTASSIUM SERPL-SCNC: 4.2 MMOL/L — SIGNIFICANT CHANGE UP (ref 3.5–5.3)
RBC # BLD: 4.84 M/UL — SIGNIFICANT CHANGE UP (ref 4.2–5.8)
RBC # FLD: 12.4 % — SIGNIFICANT CHANGE UP (ref 10.3–14.5)
SODIUM SERPL-SCNC: 141 MMOL/L — SIGNIFICANT CHANGE UP (ref 135–145)
WBC # BLD: 6.48 K/UL — SIGNIFICANT CHANGE UP (ref 3.8–10.5)
WBC # FLD AUTO: 6.48 K/UL — SIGNIFICANT CHANGE UP (ref 3.8–10.5)

## 2023-09-27 PROCEDURE — C1717: CPT

## 2023-09-27 PROCEDURE — 93005 ELECTROCARDIOGRAM TRACING: CPT

## 2023-09-27 PROCEDURE — 93010 ELECTROCARDIOGRAM REPORT: CPT | Mod: 77

## 2023-09-27 PROCEDURE — C1769: CPT

## 2023-09-27 PROCEDURE — 77470 SPECIAL RADIATION TREATMENT: CPT

## 2023-09-27 PROCEDURE — C1874: CPT

## 2023-09-27 PROCEDURE — C9600: CPT | Mod: LD

## 2023-09-27 PROCEDURE — 80048 BASIC METABOLIC PNL TOTAL CA: CPT

## 2023-09-27 PROCEDURE — 99152 MOD SED SAME PHYS/QHP 5/>YRS: CPT

## 2023-09-27 PROCEDURE — 99221 1ST HOSP IP/OBS SF/LOW 40: CPT | Mod: 25

## 2023-09-27 PROCEDURE — 77316 BRACHYTX ISODOSE PLAN SIMPLE: CPT

## 2023-09-27 PROCEDURE — 77316 BRACHYTX ISODOSE PLAN SIMPLE: CPT | Mod: 26

## 2023-09-27 PROCEDURE — C1728: CPT

## 2023-09-27 PROCEDURE — 92974 CATH PLACE CARDIO BRACHYTX: CPT

## 2023-09-27 PROCEDURE — 77470 SPECIAL RADIATION TREATMENT: CPT | Mod: 26

## 2023-09-27 PROCEDURE — 77263 THER RADIOLOGY TX PLNG CPLX: CPT

## 2023-09-27 PROCEDURE — C1725: CPT

## 2023-09-27 PROCEDURE — 92928 PRQ TCAT PLMT NTRAC ST 1 LES: CPT | Mod: LD

## 2023-09-27 PROCEDURE — 77290 THER RAD SIMULAJ FIELD CPLX: CPT | Mod: 26

## 2023-09-27 PROCEDURE — 77770 HDR RDNCL NTRSTL/ICAV BRCHTX: CPT

## 2023-09-27 PROCEDURE — C1894: CPT

## 2023-09-27 PROCEDURE — 77370 RADIATION PHYSICS CONSULT: CPT

## 2023-09-27 PROCEDURE — 77290 THER RAD SIMULAJ FIELD CPLX: CPT

## 2023-09-27 PROCEDURE — 93010 ELECTROCARDIOGRAM REPORT: CPT

## 2023-09-27 PROCEDURE — 77770 HDR RDNCL NTRSTL/ICAV BRCHTX: CPT | Mod: 26

## 2023-09-27 PROCEDURE — C1887: CPT

## 2023-09-27 PROCEDURE — 85027 COMPLETE CBC AUTOMATED: CPT

## 2023-09-27 RX ORDER — TAMSULOSIN HYDROCHLORIDE 0.4 MG/1
1 CAPSULE ORAL
Refills: 0 | DISCHARGE

## 2023-09-27 RX ORDER — METOPROLOL TARTRATE 50 MG
1 TABLET ORAL
Qty: 0 | Refills: 0 | DISCHARGE

## 2023-09-27 RX ORDER — SODIUM CHLORIDE 9 MG/ML
1000 INJECTION INTRAMUSCULAR; INTRAVENOUS; SUBCUTANEOUS
Refills: 0 | Status: DISCONTINUED | OUTPATIENT
Start: 2023-09-27 | End: 2023-10-11

## 2023-09-27 RX ORDER — CLONAZEPAM 1 MG
1 TABLET ORAL
Refills: 0 | DISCHARGE

## 2023-09-27 RX ORDER — LAMOTRIGINE 25 MG/1
1 TABLET, ORALLY DISINTEGRATING ORAL
Refills: 0 | DISCHARGE

## 2023-09-27 RX ORDER — VENLAFAXINE HCL 75 MG
1 CAPSULE, EXT RELEASE 24 HR ORAL
Refills: 0 | DISCHARGE

## 2023-09-27 RX ORDER — ALPRAZOLAM 0.25 MG
1 TABLET ORAL
Refills: 0 | DISCHARGE

## 2023-09-27 RX ORDER — ATORVASTATIN CALCIUM 80 MG/1
1 TABLET, FILM COATED ORAL
Qty: 0 | Refills: 0 | DISCHARGE

## 2023-09-27 RX ORDER — SODIUM CHLORIDE 9 MG/ML
250 INJECTION INTRAMUSCULAR; INTRAVENOUS; SUBCUTANEOUS ONCE
Refills: 0 | Status: COMPLETED | OUTPATIENT
Start: 2023-09-27 | End: 2023-09-27

## 2023-09-27 RX ADMIN — SODIUM CHLORIDE 750 MILLILITER(S): 9 INJECTION INTRAMUSCULAR; INTRAVENOUS; SUBCUTANEOUS at 09:38

## 2023-09-27 RX ADMIN — SODIUM CHLORIDE 100 MILLILITER(S): 9 INJECTION INTRAMUSCULAR; INTRAVENOUS; SUBCUTANEOUS at 17:44

## 2023-09-27 RX ADMIN — SODIUM CHLORIDE 75 MILLILITER(S): 9 INJECTION INTRAMUSCULAR; INTRAVENOUS; SUBCUTANEOUS at 09:38

## 2023-09-27 NOTE — H&P CARDIOLOGY - HISTORY OF PRESENT ILLNESS
This is a 62 yo  M with no known implantable devices noted with PMHX of HTN, CAD with 11 stents, NSTEMI,  recent cath 7/28/23 with LAD stent occluded, was ballooned, PMD Cornelius Cardio Partha. Non smoker. No fever, chills. No cough, sob. No calf pain or swelling. Now presents for scheduled Brachytherapy today with Dr. Camara .   Currently CP free .   Cardiologist Partha   < from: TTE Echo Complete w/o Contrast w/ Doppler (09.02.23 @ 07:42) >  Impression     Summary     The mitral valve leaflets appear thin and normal.   Trace mitral regurgitation is present.   The aortic valve is trileaflet with thin pliable leaflets.   The tricuspid valve leaflets are thin and pliable; valve motion is   normal.   Trace tricuspid valve regurgitation is present.   Normal appearing left atrium.   The left ventricle is normal in size, wall thickness, wall motion and   contractility.   Estimated left ventricular ejection fraction is 65-70 %.   Normal appearing right atrium.   Normal appearing right ventricle structure and function.     Signature     ----------------------------------------------------------------   Electronically signed by Lazaro Fuentes MD(Interpreting   physician) on 09/02/2023 06:08 PM    < end of copied text >  < from: Cardiac Catheterization (07.28.23 @ 14:43) >  Conclusions:   Successful PCI with balloon angioplasty of diffuse restenosis of the  mid to distal LAD.  Plan brachytherapy in 1-2 mths    Procedure Narrative:   The risks and alternatives of the procedures and conscious sedation  were explained to the patient and informed consent was  obtained. The patient was brought to the cath lab and placed on the  exam table.  Access   Right femoral artery:   The puncture site was infiltrated with 1% Lidocaine. Vascular access  was obtained using modified seldinger technique.    Diagnostic Findings:     Coronary Angiography   The coronary circulation is right dominant.      LM   Left main artery: Angiography shows no disease.      LAD   Distal left anterior descending: There is a 90% stenosis.      CX   Circumflex: Angiography shows minor irregularities. Second obtuse  marginal: There is a 70 % stenosis.    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 Non smoker. No fever, chills. No cough, sob. No calf pain or swelling.   Now presents for scheduled Brachytherapy to LAD today with Dr. Camara . Currently CP free no sob no palpitations noted   Currently CP free .   Cardiologist Partha   < from: TTE Echo Complete w/o Contrast w/ Doppler (09.02.23 @ 07:42) >  Impression     Summary     The mitral valve leaflets appear thin and normal.   Trace mitral regurgitation is present.   The aortic valve is trileaflet with thin pliable leaflets.   The tricuspid valve leaflets are thin and pliable; valve motion is   normal.   Trace tricuspid valve regurgitation is present.   Normal appearing left atrium.   The left ventricle is normal in size, wall thickness, wall motion and   contractility.   Estimated left ventricular ejection fraction is 65-70 %.   Normal appearing right atrium.   Normal appearing right ventricle structure and function.     Signature     ----------------------------------------------------------------   Electronically signed by Lazaro Fuentes MD(Interpreting   physician) on 09/02/2023 06:08 PM    < end of copied text >  < from: Cardiac Catheterization (07.28.23 @ 14:43) >  Conclusions:   Successful PCI with balloon angioplasty of diffuse restenosis of the  mid to distal LAD.  Plan brachytherapy in 1-2 mths    Procedure Narrative:   The risks and alternatives of the procedures and conscious sedation  were explained to the patient and informed consent was  obtained. The patient was brought to the cath lab and placed on the  exam table.  Access   Right femoral artery:   The puncture site was infiltrated with 1% Lidocaine. Vascular access  was obtained using modified seldinger technique.    Diagnostic Findings:     Coronary Angiography   The coronary circulation is right dominant.      LM   Left main artery: Angiography shows no disease.      LAD   Distal left anterior descending: There is a 90% stenosis.      CX   Circumflex: Angiography shows minor irregularities. Second obtuse  marginal: There is a 70 % stenosis.    RCA     Patient: CJ OCONNELL      MRN: [id number]    < end of copied text >

## 2023-09-27 NOTE — CHART NOTE - NSCHARTNOTEFT_GEN_A_CORE
Removal of Femoral Sheath    Pulses in the right lower extremity are palpable). The patient was placed in the supine position. The insertion site was identified and the sutures were removed per protocol.  The 6  U[place]bek femoral sheath was then removed. Direct pressure was applied for  18 minutes.     Monitoring of the right groin and both lower extremities including neuro-vascular checks and vital signs every 15 minutes x 4, then every 30 minutes x 2, then every 1 hour was ordered.    No complications

## 2023-09-27 NOTE — ASU DISCHARGE PLAN (ADULT/PEDIATRIC) - "IF YOU OR YOUR GUARDIAN/FAMILY IS A SMOKER, IT IS IMPORTANT FOR YOUR HEALTH TO STOP SMOKING. PLEASE BE AWARE THAT SECOND HAND SMOKE IS ALSO HARMFUL."
Statement Selected
Right ear hearing screen completed date: 2023  Right ear screen method: EOAE (evoked otoacoustic emission)  Right ear screen result: Passed  Right ear screen comment: N/A    Left ear hearing screen completed date: 2023  Left ear screen method: EOAE (evoked otoacoustic emission)  Left ear screen result: Passed  Left ear screen comments: N/A

## 2023-09-27 NOTE — ED ADULT TRIAGE NOTE - PAIN RATING/NUMBER SCALE (0-10): ACTIVITY
Lower abdominal pain started last wednesday. Pain comes and goes, \"sharp, extreme cramping\" nothing makes it better or worse. Pt states pain is worse in the mornings. Pt states pain was at it's worst around 1:30 pm today and she took motrin with little no no relief. Unable to recreate pain.  Slight tenderness when pressing on mid lower abdomen
0

## 2023-09-29 ENCOUNTER — TRANSCRIPTION ENCOUNTER (OUTPATIENT)
Age: 63
End: 2023-09-29

## 2023-09-29 NOTE — PROGRESS NOTE ADULT - PROVIDER SPECIALTY LIST ADULT
Caller: Gera Lira SAQIB    Relationship: Self    Best call back number: [phone]     PLEASE CALL TO ADVISE.     Requested Prescriptions:   Requested Prescriptions     Pending Prescriptions Disp Refills    losartan (COZAAR) 50 MG tablet 45 tablet 5     Sig: Take 1.5 tablets by mouth Daily.    metoprolol succinate XL (TOPROL-XL) 50 MG 24 hr tablet 90 tablet 1     Sig: Take 1 tablet by mouth Every Night.        Pharmacy where request should be sent: [facility][id number] [address] - [phone] [facility] [phone] FX     Last office visit with prescribing clinician: 8/28/2023   Last telemedicine visit with prescribing clinician: Visit date not found   Next office visit with prescribing clinician: 1/16/2024     Additional details provided by patient: PATIENT IS OUT OF losartan (COZAAR) 50 MG tablet AND THE PHARMACY IS NEEDING A NEW WRITTEN PRESCRIPTION WITH THE INCREASE IN DOSAGE SUBMITTED BEFORE THEY CAN REFILL.        Does the patient have less than a 3 day supply:  [x] Yes  [] No    Would you like a call back once the refill request has been completed: [x] Yes [] No    If the office needs to give you a call back, can they leave a voicemail: [x] Yes [] No    Pj Serrato Rep   09/29/23 09:37 EDT                    [facility]

## 2023-10-26 ENCOUNTER — RX RENEWAL (OUTPATIENT)
Age: 63
End: 2023-10-26

## 2023-12-05 PROBLEM — U07.1 COVID-19 VIRUS INFECTION: Status: ACTIVE | Noted: 2023-12-05

## 2023-12-06 RX ORDER — VENLAFAXINE HYDROCHLORIDE 150 MG/1
150 CAPSULE, EXTENDED RELEASE ORAL DAILY
Qty: 30 | Refills: 5 | Status: ACTIVE | COMMUNITY
Start: 2022-06-07 | End: 1900-01-01

## 2023-12-07 LAB
RAPID RVP RESULT: DETECTED
SARS-COV-2 RNA PNL RESP NAA+PROBE: DETECTED

## 2023-12-19 DIAGNOSIS — R11.2 NAUSEA WITH VOMITING, UNSPECIFIED: ICD-10-CM

## 2023-12-19 RX ORDER — ONDANSETRON 4 MG/5ML
4 SOLUTION ORAL
Qty: 30 | Refills: 0 | Status: DISCONTINUED | COMMUNITY
Start: 2023-04-07 | End: 2023-12-19

## 2023-12-19 RX ORDER — METRONIDAZOLE 250 MG/1
250 TABLET ORAL
Qty: 30 | Refills: 0 | Status: DISCONTINUED | COMMUNITY
Start: 2023-04-07 | End: 2023-12-19

## 2023-12-19 RX ORDER — AMOXICILLIN 250 MG/5ML
250 POWDER, FOR SUSPENSION ORAL 3 TIMES DAILY
Qty: 1 | Refills: 0 | Status: DISCONTINUED | COMMUNITY
Start: 2023-04-07 | End: 2023-12-19

## 2023-12-19 RX ORDER — GENTAMICIN SULFATE 3 MG/ML
0.3 SOLUTION OPHTHALMIC 4 TIMES DAILY
Qty: 1 | Refills: 3 | Status: DISCONTINUED | COMMUNITY
Start: 2022-02-23 | End: 2023-12-19

## 2023-12-19 RX ORDER — OFLOXACIN 3 MG/ML
0.3 SOLUTION/ DROPS OPHTHALMIC 4 TIMES DAILY
Qty: 1 | Refills: 2 | Status: DISCONTINUED | COMMUNITY
Start: 2022-02-10 | End: 2023-12-19

## 2023-12-19 RX ORDER — HYDROCODONE BITARTRATE AND HOMATROPINE METHYLBROMIDE 1.5; 5 MG/5ML; MG/5ML
5-1.5 SOLUTION ORAL
Qty: 300 | Refills: 0 | Status: DISCONTINUED | COMMUNITY
Start: 2023-12-05 | End: 2023-12-19

## 2023-12-19 RX ORDER — DOXYCLYCLINE HYCLATE 75 MG/1
75 TABLET, COATED ORAL
Qty: 20 | Refills: 0 | Status: DISCONTINUED | COMMUNITY
Start: 2023-04-07 | End: 2023-12-19

## 2023-12-19 RX ORDER — AZITHROMYCIN 500 MG/1
500 TABLET, FILM COATED ORAL DAILY
Qty: 7 | Refills: 0 | Status: DISCONTINUED | COMMUNITY
Start: 2023-12-05 | End: 2023-12-19

## 2023-12-19 RX ORDER — PREDNISONE 20 MG/1
20 TABLET ORAL TWICE DAILY
Qty: 60 | Refills: 3 | Status: DISCONTINUED | COMMUNITY
Start: 2023-12-05 | End: 2023-12-19

## 2024-02-27 NOTE — ED ADULT TRIAGE NOTE - BSA (M2)
Family Medicine - Progress Note      SUBJECTIVE:     No acute overnight events reported by night team.  Patient reported that he was doing well today.  He denied N/V/D, constipation, SOB, chest pain.  Patient did report that he had a headache and was having bilateral ear pain.  He believed that his ear pain may have to do with his headache.  He denied any changes in hearing, discharge, ringing in the ears, dizziness.  Did report having a bit of anxiety when finding out he would not be able to eat until after his TTE.    OBJECTIVE:     Vitals:   Vital Last Value 24 Hour Range   Temperature 98.1 °F (36.7 °C) (02/27/24 1059) Temp  Min: 97.9 °F (36.6 °C)  Max: 99.1 °F (37.3 °C)   Pulse 80 (02/27/24 1059) Pulse  Min: 80  Max: 109   Respiratory 18 (02/27/24 1059) Resp  Min: 16  Max: 27   Non-Invasive  Blood Pressure 136/74 (02/27/24 1059) BP  Min: 131/73  Max: 165/74   Pulse Oximetry 97 % (02/27/24 1059) SpO2  Min: 96 %  Max: 100 %   Arterial   Blood Pressure   No data recorded       I/O:   Intake/Output Summary (Last 24 hours) at 2/27/2024 1122  Last data filed at 2/27/2024 0500  Gross per 24 hour   Intake --   Output 500 ml   Net -500 ml       Physical Exam: Physical Exam  Vitals and nursing note reviewed.   Constitutional:       Appearance: Normal appearance.   HENT:      Head: Normocephalic and atraumatic.      Right Ear: External ear normal.      Left Ear: External ear normal.      Nose: Nose normal.      Mouth/Throat:      Pharynx: Oropharynx is clear.      Neck: Normal range of motion.   Eyes:      Conjunctiva/sclera: Conjunctivae normal.   Cardiovascular:      Rate and Rhythm: Regular rhythm. Tachycardia present.      Heart sounds: Normal heart sounds.   Pulmonary:      Effort: Pulmonary effort is normal.      Breath sounds: Normal breath sounds. No wheezing, rhonchi or rales.   Abdominal:      General: Bowel sounds are normal.      Palpations: Abdomen is soft.      Tenderness: There is no abdominal tenderness.    Musculoskeletal:         General: Normal range of motion.      Right lower leg: Edema present.      Left lower leg: Edema present.   Skin:     General: Skin is warm.      Capillary Refill: Capillary refill takes less than 2 seconds.   Neurological:      General: No focal deficit present.      Mental Status: He is alert and oriented to person, place, and time.   Psychiatric:         Mood and Affect: Mood normal.         Behavior: Behavior normal.         Thought Content: Thought content normal.         Judgment: Judgment normal.          Labs:   Recent Results (from the past 24 hour(s))   COVID/Flu/RSV panel    Collection Time: 02/26/24 12:36 PM   Result Value Ref Range    Rapid SARS-COV-2 by PCR Not Detected Not Detected / Detected / Presumptive Positive / Inhibitors present    Influenza A by PCR Not Detected Not Detected    Influenza B by PCR Not Detected Not Detected    RSV BY PCR Not Detected Not Detected    Isolation Guidelines      Procedural Comment     TROPONIN I, HIGH SENSITIVITY    Collection Time: 02/26/24 12:39 PM   Result Value Ref Range    Troponin I, High Sensitivity 8 <77 ng/L   D Dimer, Quantitative    Collection Time: 02/26/24 12:39 PM   Result Value Ref Range    D Dimer, Quantitative 2.17 (H) <0.57 mg/L (FEU)   TROPONIN I, HIGH SENSITIVITY    Collection Time: 02/26/24  9:33 PM   Result Value Ref Range    Troponin I, High Sensitivity 10 <77 ng/L   GLUCOSE, BEDSIDE - POINT OF CARE    Collection Time: 02/26/24 10:18 PM   Result Value Ref Range    GLUCOSE, BEDSIDE - POINT OF CARE 170 (H) 70 - 99 mg/dL   Basic Metabolic Panel    Collection Time: 02/27/24  6:47 AM   Result Value Ref Range    Fasting Status      Sodium 140 135 - 145 mmol/L    Potassium 3.1 (L) 3.4 - 5.1 mmol/L    Chloride 108 97 - 110 mmol/L    Carbon Dioxide 26 21 - 32 mmol/L    Anion Gap 9 7 - 19 mmol/L    Glucose 95 70 - 99 mg/dL    BUN 10 6 - 20 mg/dL    Creatinine 0.54 (L) 0.67 - 1.17 mg/dL    Glomerular Filtration Rate >90 >=60     BUN/Cr 19 7 - 25    Calcium 7.3 (L) 8.4 - 10.2 mg/dL   CBC No Differential    Collection Time: 02/27/24  6:47 AM   Result Value Ref Range    WBC 3.5 (L) 4.2 - 11.0 K/mcL    RBC 2.37 (L) 4.50 - 5.90 mil/mcL    HGB 7.9 (L) 13.0 - 17.0 g/dL    HCT 23.7 (L) 39.0 - 51.0 %    .0 78.0 - 100.0 fl    MCH 33.3 26.0 - 34.0 pg    MCHC 33.3 32.0 - 36.5 g/dL    PLT 53 (L) 140 - 450 K/mcL    RDW-CV 19.3 (H) 11.0 - 15.0 %    RDW-SD 68.6 (H) 39.0 - 50.0 fL    NRBC 0 <=0 /100 WBC   GLUCOSE, BEDSIDE - POINT OF CARE    Collection Time: 02/27/24  7:41 AM   Result Value Ref Range    GLUCOSE, BEDSIDE - POINT OF CARE 103 (H) 70 - 99 mg/dL       Imaging:   CTA CHEST PULMONARY EMBOLISM   Final Result   *   No large/central pulmonary embolism, though distal evaluation is   limited due to artifact. If strong clinical suspicion for distal pulmonary   emboli, examination could be repeated if clinically appropriate.   *   Hepatic cirrhosis with sequelae of portal hypertension status post TIPS   and likely transvenous obliteration of gastric varices. Wedge-shaped   hypoenhancing region in the hepatic dome likely represents posttreatment   change from prior HCC treatment. Please correlate with outside   imaging/history.         Electronically Signed by: CHENTE SANTOS M.D.    Signed on: 2/26/2024 2:49 PM    Workstation ID: [id number]      XR CHEST PA AND LATERAL 2 VIEWS   Final Result   FINDINGS/IMPRESSION:      Borderline appearing heart size.      Mild prominence of bronchovascular and interstitial markings slightly   worsened compared to prior study.      No focal lung consolidation. No effusions or significant thickening of   fissures. No thickening of interlobular septa. No cephalization of vessels.         Electronically Signed by: DOMINICK TSAI M.D.    Signed on: 2/26/2024 10:27 AM    Workstation ID: [id number]           ASSESSMENT & PLAN:     Patient is a 67 year old male with PMHx significant for type 2 diabetes  mellitus, HTN, dyslipidemia, bph, alcoholic cirrhosis c/b hepatocellular carcinoma now status post TACE 01/2022 and TARE 10/26/2023  who presented with chest pain admitted for further cardiac evaluation.     Principal Problem:    Chest pain, unspecified type  Active Problems:    Primary hypertension    Other specified hypothyroidism    ALC (alcoholic liver cirrhosis) (CMD)    Diabetes mellitus type 2, uncomplicated (CMD)    Hepatocellular carcinoma (CMD)    Chest pain    Gastroesophageal reflux disease without esophagitis    Benign prostatic hyperplasia without lower urinary tract symptoms      #Chest Pain  Could potentially be due to anxiety versus MSK versus PE versus CAD versus ACS versus infectious  -COVID/flu/RSV negative  -Troponin negative x 2  -proBNP 176, indicative of heart demand  -D-dimer 2.17 indicative of potential PE  -Less likely PE as CTA PE returned without showing large/central pulmonary embolism.  May need to repeat due to difficulty visualizing distal pulmonary emboli.   -CXR no lung consolidation, no effusions, no pneumothorax  -ordered  stress test  -Follow-up TTE  Plan  -Consult cardiology depending on results    #Hypokalemia  -2/27 potassium at 3.1, now status post KLOR-CON packet 40 MEQ    #Anemia  -Baseline (9-10)  -Hgb on admission 8.5, CTM  -Macrocytic anemia  -CTM     #type 2 diabetes mellitus  -LDISS  -Current 20 units lantus nightly  - Home Lantus 35 units daily and Metformin 850 mg twice daily (held)     #alcoholic cirrhosis c/b hepatocellular carcinoma now status post TACE 01/2022 and TARE 10/26/2023  - Rifaximin 550 mg twice daily and lactulose 10 g daily for hepatic encephalopathy prophylaxis  -Lactulose held on 2/27 until able to eat due to stomach side effects.     #HTN   - Losartan 100 mg daily     #GERD  - Pantoprazole 40 mg daily     # HLD  - Lipitor 40 mg nightly     #Hypothyroidism  - Levothyroxine 75 mcg     #BPH  -Tamsulosin 0.4 mg once daily     #Derm issue  -Doxycycline  hyclate 100 mg daily (2/21 S-14 days)    Fluids: SLIV  Electrolytes: Monitor; Replete PRN.   Nutrition: Cardiac/ NPO until results of TTE return    DVT Prophylaxis: SCD's, Lovenox    Code Status: Full Resuscitation   Emergency Contact: Extended Emergency Contact Information  Primary Emergency Contact: JOSHUA CASTRO  Mobile Phone: [phone]  Relation: Spouse  Secondary Emergency Contact: Mynor Castro  Mobile Phone: [phone]  Relation: Son  Primary Care Physician: Fransisco Verduzco MD    Case d/w attending.     Yoon Jama MD  Family Medicine   PGY1  Can be reached via [employer]     2.41

## 2024-03-19 RX ORDER — LAMOTRIGINE 100 MG/1
100 TABLET ORAL DAILY
Qty: 180 | Refills: 3 | Status: ACTIVE | COMMUNITY
Start: 2022-06-07 | End: 1900-01-01

## 2024-03-19 RX ORDER — ATORVASTATIN CALCIUM 80 MG/1
80 TABLET, FILM COATED ORAL
Qty: 90 | Refills: 3 | Status: ACTIVE | COMMUNITY
Start: 2017-03-31 | End: 1900-01-01

## 2024-03-19 RX ORDER — METOPROLOL SUCCINATE 50 MG/1
50 TABLET, EXTENDED RELEASE ORAL DAILY
Qty: 90 | Refills: 3 | Status: ACTIVE | COMMUNITY
Start: 2017-03-31 | End: 1900-01-01

## 2024-04-14 ENCOUNTER — EMERGENCY (EMERGENCY)
Facility: HOSPITAL | Age: 64
LOS: 0 days | Discharge: HOME | End: 2024-04-14
Attending: EMERGENCY MEDICINE
Payer: COMMERCIAL

## 2024-04-14 VITALS
OXYGEN SATURATION: 97 % | HEART RATE: 88 BPM | WEIGHT: 240.08 LBS | RESPIRATION RATE: 18 BRPM | DIASTOLIC BLOOD PRESSURE: 80 MMHG | HEIGHT: 75 IN | SYSTOLIC BLOOD PRESSURE: 136 MMHG | TEMPERATURE: 98 F

## 2024-04-14 DIAGNOSIS — Z95.5 PRESENCE OF CORONARY ANGIOPLASTY IMPLANT AND GRAFT: Chronic | ICD-10-CM

## 2024-04-14 DIAGNOSIS — S51.851A OPEN BITE OF RIGHT FOREARM, INITIAL ENCOUNTER: ICD-10-CM
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
## 2024-04-14 DIAGNOSIS — I25.2 OLD MYOCARDIAL INFARCTION: ICD-10-CM

## 2024-04-14 DIAGNOSIS — S61.250A OPEN BITE OF RIGHT INDEX FINGER WITHOUT DAMAGE TO NAIL, INITIAL ENCOUNTER: ICD-10-CM

## 2024-04-14 DIAGNOSIS — Z23 ENCOUNTER FOR IMMUNIZATION: ICD-10-CM

## 2024-04-14 DIAGNOSIS — I10 ESSENTIAL (PRIMARY) HYPERTENSION: ICD-10-CM
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
## 2024-04-14 DIAGNOSIS — I25.10 ATHEROSCLEROTIC HEART DISEASE OF NATIVE CORONARY ARTERY WITHOUT ANGINA PECTORIS: ICD-10-CM

## 2024-04-14 DIAGNOSIS — S61.451A OPEN BITE OF RIGHT HAND, INITIAL ENCOUNTER: ICD-10-CM

## 2024-04-14 PROCEDURE — 90715 TDAP VACCINE 7 YRS/> IM: CPT

## 2024-04-14 PROCEDURE — 99284 EMERGENCY DEPT VISIT MOD MDM: CPT

## 2024-04-14 PROCEDURE — 73140 X-RAY EXAM OF FINGER(S): CPT | Mod: 26,RT

## 2024-04-14 PROCEDURE — 99283 EMERGENCY DEPT VISIT LOW MDM: CPT | Mod: 25

## 2024-04-14 PROCEDURE — 73140 X-RAY EXAM OF FINGER(S): CPT | Mod: RT

## 2024-04-14 PROCEDURE — 90471 IMMUNIZATION ADMIN: CPT

## 2024-04-14 RX ORDER — TETANUS TOXOID, REDUCED DIPHTHERIA TOXOID AND ACELLULAR PERTUSSIS VACCINE, ADSORBED 5; 2.5; 8; 8; 2.5 [IU]/.5ML; [IU]/.5ML; UG/.5ML; UG/.5ML; UG/.5ML
0.5 SUSPENSION INTRAMUSCULAR ONCE
Refills: 0 | Status: COMPLETED | OUTPATIENT
Start: 2024-04-14 | End: 2024-04-14

## 2024-04-14 RX ADMIN — TETANUS TOXOID, REDUCED DIPHTHERIA TOXOID AND ACELLULAR PERTUSSIS VACCINE, ADSORBED 0.5 MILLILITER(S): 5; 2.5; 8; 8; 2.5 SUSPENSION INTRAMUSCULAR at 15:53

## 2024-04-14 NOTE — ED STATDOCS - CARE PROVIDER_API CALL
Bettina Webber  Orthopaedic Surgery  [address] [place] [address]  Phone: [phone]  Fax: [phone]  Follow Up Time:

## 2024-04-14 NOTE — ED STATDOCS - PATIENT PORTAL LINK FT
You can access the FollowMyHealth Patient Portal offered by [facility] by registering at the following website: [email or web address]. By joining [employer]’s FollowMyHealth portal, you will also be able to view your health information using other applications (apps) compatible with our system.

## 2024-04-14 NOTE — ED ADULT TRIAGE NOTE - CHIEF COMPLAINT QUOTE
Pt presented to the ER with c/o laceration to his first finger. Pt stated that he was going to feed a cat when the ate bit his finger. Pt noted to have multiple small laceration to his right first finger and wrist. Bleeding controlled at this time.

## 2024-04-14 NOTE — ED STATDOCS - PHYSICAL EXAMINATION
Constitutional: NAD   Eyes: PERRLA  Head: Normocephalic   Mouth: MMM  Cardiac: regular rate   Resp: Lungs CTAB  GI: Abd s/nt/nd, no rebound or guarding.  Neuro: awake, alert, moving all extremities  Skin: No rashes   MSK: multiple bites to R 2nd digit, hand, R distal forearm, NV intact

## 2024-04-14 NOTE — ED STATDOCS - OBJECTIVE STATEMENT
65 y/o M with PMHx of CAD, HTN, STEMI presents to the ED c/o cat bite to R 2nd digit. Pt is R hand dominant. NKDA. Tetanus not UTD.

## 2024-04-14 NOTE — ED STATDOCS - NSFOLLOWUPINSTRUCTIONS_ED_ALL_ED_FT
Animal Bite, Adult  Animal bites range from mild to serious. An animal bite can result in any of these injuries:  A scratch.  A deep, open cut.  Broken (punctured) or torn skin.  A crush injury.  A bone injury.  A small bite from a house pet is usually less serious than a bite from a stray or wild animal. Cat bites can be more serious because their long, thin teeth can cause deep puncture wounds that close fast, trapping bacteria inside.    Stray or wild animals, such as a raccoon, gibbs, skunk, or bat, are at higher risk of carrying a serious infection called rabies, which they can pass to a human through a bite. A bite from one of these animals needs medical care right away and, sometimes, rabies vaccination.    What increases the risk?  You are more likely to be bitten by an animal if:  You are around unfamiliar pets.  You disturb an animal when it is eating, sleeping, or caring for its babies.  You are outdoors in a place where small, wild animals roam freely.  What are the signs or symptoms?  Common symptoms of an animal bite include:  Pain.  Bleeding.  Swelling.  Bruising.  How is this diagnosed?  This condition may be diagnosed based on a physical exam and medical history. Your health care provider will examine your wound and ask for details about the animal and how the bite happened. You may also have tests, such as:  Blood tests to check for infection.  X-rays to check for damage to bones or joints.  Taking a fluid sample from your wound and checking it for infection (culture test).  How is this treated?  Treatment depends on the type of animal, where the bite is on your body, and your medical history. Treatment may include:  Wound care. This often includes cleaning the wound and rinsing it out (flushing it) with saline solution, which is made of salt and water. A bandage (dressing) is also often applied. In rare cases, the wound may be closed with stitches (sutures), staples, skin glue, or adhesive strips.  Antibiotic medicine to prevent or treat infection. This medicine may be prescribed in pill or ointment form. If the bite area gets infected, the medicine may be given through an IV.  A tetanus shot to prevent tetanus infection.  Rabies treatment to prevent rabies infection, if the animal could have rabies.  Surgery. This may be done if a bite gets infected or causes damage that needs to be repaired.  Follow these instructions at home:  Medicines    Take or apply over-the-counter and prescription medicines only as told by your health care provider.  If you were prescribed an antibiotic medicine, take or apply it as told by your health care provider. Do not stop using the antibiotic even if you start to feel better.  Wound care    Two stitched wounds. One is normal. The other is red with pus and infected.  Follow instructions from your health care provider about how to take care of your wound. Make sure you:  Wash your hands with soap and water for at least 20 seconds before and after you change your dressing. If soap and water are not available, use hand .  Change your dressing as told by your health care provider.  Leave sutures, skin glue, or adhesive strips in place. These skin closures may need to stay in place for 2 weeks or longer. If adhesive strip edges start to loosen and curl up, you may trim the loose edges. Do not remove adhesive strips completely unless your health care provider tells you to do that.  Check your wound every day for signs of infection. Check for:  More redness, swelling, or pain.  More fluid or blood.  Warmth.  Pus or a bad smell.  General instructions    Bag of ice on a towel on the skin.   Raise (elevate) the injured area above the level of your heart while you are sitting or lying down, if this is possible.  If directed, put ice on the injured area. To do this:  Put ice in a plastic bag.  Place a towel between your skin and the bag.  Leave the ice on for 20 minutes, 2–3 times per day.  Remove the ice if your skin turns bright red. This is very important. If you cannot feel pain, heat, or cold, you have a greater risk of damage to the area.  Keep all follow-up visits. This is important.  Contact a health care provider if:  You have more redness, swelling, or pain around your wound.  Your wound feels warm to the touch.  You have a fever or chills.  You have a general feeling of sickness (malaise).  You feel nauseous or you vomit.  You have pain that does not get better.  Get help right away if:  You have a red streak that leads away from your wound.  You have non-clear fluid or more blood coming from your wound.  There is pus or a bad smell coming from your wound.  You have trouble moving your injured area.  You have numbness or tingling that spreads beyond your wound.  Summary  Animal bites can range from mild to serious. An animal bite can cause a scratch on the skin, a deep and open cut, torn or punctured skin, a crush injury, or a bone injury.  A bite from a stray or wild animal needs medical care right away and, sometimes, rabies vaccination.  Your health care provider will examine your wound and ask for details about the animal and how the bite happened.  Treatment may include wound care, antibiotic medicine, a tetanus shot, and rabies treatment if the animal could have rabies.

## 2024-04-14 NOTE — ED STATDOCS - ATTENDING APP SHARED VISIT CONTRIBUTION OF CARE
I, Shauna Oconnor MD, performed the initial face to face bedside interview with this patient regarding history of present illness, review of symptoms and relevant past medical, social and family history.  I completed an independent physical examination.  I was the initial provider who evaluated this patient. I have signed out the follow up of any pending tests (i.e. labs, radiological studies) to the ACP.  I have communicated the patient’s plan of care and disposition with the ACP.  The history, relevant review of systems, past medical and surgical history, medical decision making, and physical examination was documented by the scribe in my presence and I attest to the accuracy of the documentation.

## 2024-04-14 NOTE — ED STATDOCS - PROGRESS NOTE DETAILS
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 Pt was takign care of his daughter's cat and was bit by the cat. Pt's last tdap is unknown.   R index finger is swollen with multiple cat bite to the R index, R hand, and distal forearm. Will clean wound and update tdap and place pt on abx. -Eduardo Anderson PA-C XR unremarkable. Cleaned wound with betadine, applied bacitracin, and wrapped gauze/kerlix. Pt states he called Dr Webber and will f/u with her. -Eduardo Anderson PA-C

## 2024-04-16 DIAGNOSIS — R11.0 NAUSEA: ICD-10-CM

## 2024-05-06 NOTE — ED PROVIDER NOTE - CPE EDP HEME LYMPH NORM
Patient here today with mother for STD testing. Patient reports being sexually active on two occasions unprotected. She denies known exposure to STD and denies vaginal discharge, dysuria, or vaginal lesions. Her menstrual cycle is current on. Mother would like to start Depo.  UPT negative. Discussed possible irregular bleeding for 3-6 months with injections. Pain, swelling and redness at injection site. Also discussed the possibility of irregular periods with injections or the possibility of amenorrhea. Patient understands that she must return to injection center every 10-13 weeks for repeat injection. Safe sex practices discussed with patient and mother. Return to clinic in 2 days for lab results and as needed.       ROS: denies tobacco use , hx blood clots or smoking  Vitals:    05/06/24 1618   BP: 134/84   Pulse: (!) 111   Resp: 20   Temp: 97.6 °F (36.4 °C)        GENERAL: No fever, chills, fatigability or weight loss.  ABDOMEN: No abdominal pain. Denies nausea. Denies vomiting. No diarrhea. No constipation  BREAST: Denies pain. No lumps. No discharge.  URINARY: No incontinence, no nocturia, no frequency and no dysuria.  CARDIOVASCULAR: No chest pain. No shortness of breath. No leg cramps. No swelling to legs.  NEUROLOGICAL: No headaches. No vision changes.  : See HPI    PHYSICAL EXAM:   General : normal appearance  Resp: clear bilaterally  Cards: s1, s2, no murmurs.  GI: soft, nontender, nondistended  : no CVA tenderness  Vagina- exam deferred          Discusses possible irregular bleeding for 3-6 months with injections. Pain, swelling and redness at injection site. Also discussed the possibility of irregular periods with injections or the possibility of amenorrhea. Patient understands that she must return to injection center every 10-13weeks for repeat injection.  LMP: 5/6/24  Last depo injection : initial injection  Hpv series    
normal...

## 2024-06-11 DIAGNOSIS — E78.5 HYPERLIPIDEMIA, UNSPECIFIED: ICD-10-CM

## 2024-06-13 ENCOUNTER — NON-APPOINTMENT (OUTPATIENT)
Age: 64
End: 2024-06-13

## 2024-06-13 LAB
25(OH)D3 SERPL-MCNC: 12.6 NG/ML
ALBUMIN SERPL ELPH-MCNC: 4.6 G/DL
ALP BLD-CCNC: 99 U/L
ALT SERPL-CCNC: 25 U/L
ANION GAP SERPL CALC-SCNC: 16 MMOL/L
APO B SERPL-MCNC: 75 MG/DL
APPEARANCE: CLEAR
AST SERPL-CCNC: 20 U/L
BACTERIA: NEGATIVE /HPF
BILIRUB SERPL-MCNC: 0.8 MG/DL
BILIRUBIN URINE: NEGATIVE
BLOOD URINE: NEGATIVE
BUN SERPL-MCNC: 15 MG/DL
CALCIUM SERPL-MCNC: 9.4 MG/DL
CAST: 1 /LPF
CHLORIDE SERPL-SCNC: 101 MMOL/L
CHOLEST SERPL-MCNC: 140 MG/DL
CO2 SERPL-SCNC: 24 MMOL/L
COLOR: NORMAL
CREAT SERPL-MCNC: 0.87 MG/DL
CRP SERPL HS-MCNC: 1.58 MG/L
EGFR: 97 ML/MIN/1.73M2
EPITHELIAL CELLS: 0 /HPF
ESTIMATED AVERAGE GLUCOSE: 103 MG/DL
GLUCOSE QUALITATIVE U: NEGATIVE MG/DL
GLUCOSE SERPL-MCNC: 94 MG/DL
HBA1C MFR BLD HPLC: 5.2 %
HCT VFR BLD CALC: 50.8 %
HCYS SERPL-MCNC: 13 UMOL/L
HDLC SERPL-MCNC: 38 MG/DL
HGB BLD-MCNC: 16 G/DL
INSULIN SERPL-MCNC: 15.7 UU/ML
KETONES URINE: NEGATIVE MG/DL
LDLC SERPL CALC-MCNC: 67 MG/DL
LEUKOCYTE ESTERASE URINE: NEGATIVE
MCHC RBC-ENTMCNC: 31.5 GM/DL
MCHC RBC-ENTMCNC: 31.6 PG
MCV RBC AUTO: 100.2 FL
MICROSCOPIC-UA: NORMAL
NITRITE URINE: NEGATIVE
NONHDLC SERPL-MCNC: 102 MG/DL
PH URINE: 6
PLATELET # BLD AUTO: 226 K/UL
POTASSIUM SERPL-SCNC: 4.4 MMOL/L
PROT SERPL-MCNC: 7.4 G/DL
PROTEIN URINE: NEGATIVE MG/DL
PSA SERPL-MCNC: 1.43 NG/ML
RBC # BLD: 5.07 M/UL
RBC # FLD: 13.5 %
RED BLOOD CELLS URINE: 2 /HPF
SODIUM SERPL-SCNC: 141 MMOL/L
SPECIFIC GRAVITY URINE: 1.03
T3FREE SERPL-MCNC: 3.18 PG/ML
T4 FREE SERPL-MCNC: 1 NG/DL
TRIGL SERPL-MCNC: 215 MG/DL
TSH SERPL-ACNC: 1.73 UIU/ML
URATE SERPL-MCNC: 5.4 MG/DL
UROBILINOGEN URINE: 1 MG/DL
WBC # FLD AUTO: 7.25 K/UL
WHITE BLOOD CELLS URINE: 0 /HPF

## 2024-06-14 ENCOUNTER — NON-APPOINTMENT (OUTPATIENT)
Age: 64
End: 2024-06-14

## 2024-06-14 DIAGNOSIS — E55.9 VITAMIN D DEFICIENCY, UNSPECIFIED: ICD-10-CM

## 2024-06-14 LAB — APO LP(A) SERPL-MCNC: 118.2 NMOL/L

## 2024-06-14 RX ORDER — ERGOCALCIFEROL 1.25 MG/1
50000 CAPSULE ORAL
Qty: 8 | Refills: 0 | Status: ACTIVE | COMMUNITY
Start: 2024-06-14 | End: 1900-01-01

## 2024-06-21 ENCOUNTER — APPOINTMENT (OUTPATIENT)
Dept: INTERNAL MEDICINE | Facility: CLINIC | Age: 64
End: 2024-06-21
Payer: COMMERCIAL

## 2024-06-21 VITALS
BODY MASS INDEX: 29.84 KG/M2 | HEART RATE: 60 BPM | WEIGHT: 240 LBS | RESPIRATION RATE: 16 BRPM | OXYGEN SATURATION: 98 % | DIASTOLIC BLOOD PRESSURE: 83 MMHG | HEIGHT: 75 IN | TEMPERATURE: 97.3 F | SYSTOLIC BLOOD PRESSURE: 126 MMHG

## 2024-06-21 DIAGNOSIS — Z78.9 OTHER SPECIFIED HEALTH STATUS: ICD-10-CM

## 2024-06-21 DIAGNOSIS — Z72.3 LACK OF PHYSICAL EXERCISE: ICD-10-CM

## 2024-06-21 PROCEDURE — 99386 PREV VISIT NEW AGE 40-64: CPT

## 2024-06-26 DIAGNOSIS — G47.33 OBSTRUCTIVE SLEEP APNEA (ADULT) (PEDIATRIC): ICD-10-CM

## 2024-06-26 DIAGNOSIS — Z00.00 ENCOUNTER FOR GENERAL ADULT MEDICAL EXAMINATION W/OUT ABNORMAL FINDINGS: ICD-10-CM

## 2024-06-26 DIAGNOSIS — K21.9 GASTRO-ESOPHAGEAL REFLUX DISEASE W/OUT ESOPHAGITIS: ICD-10-CM

## 2024-06-26 DIAGNOSIS — I25.10 ATHEROSCLEROTIC HEART DISEASE OF NATIVE CORONARY ARTERY W/OUT ANGINA PECTORIS: ICD-10-CM

## 2024-06-26 PROBLEM — Z72.3 DOES NOT EXERCISE: Status: ACTIVE | Noted: 2024-06-26

## 2024-06-26 PROBLEM — Z78.9 DOES NOT USE ILLICIT DRUGS: Status: ACTIVE | Noted: 2024-06-26

## 2024-06-26 RX ORDER — CLOPIDOGREL BISULFATE 75 MG/1
75 TABLET, FILM COATED ORAL
Qty: 90 | Refills: 3 | Status: ACTIVE | COMMUNITY
Start: 2017-03-31 | End: 1900-01-01

## 2024-06-26 NOTE — REVIEW OF SYSTEMS
[Dysuria] : no dysuria [Hematuria] : no hematuria [Joint Pain] : no joint pain [Back Pain] : no back pain [Itching] : no itching [Mole Changes] : no mole changes [Headache] : no headache [Dizziness] : no dizziness [Suicidal] : not suicidal [Insomnia] : no insomnia [Anxiety] : anxiety [Depression] : depression [Negative] : Heme/Lymph

## 2024-06-26 NOTE — PHYSICAL EXAM
[No Acute Distress] : no acute distress [Normal Sclera/Conjunctiva] : normal sclera/conjunctiva [EOMI] : extraocular movements intact [Normal Outer Ear/Nose] : the outer ears and nose were normal in appearance [Normal TMs] : both tympanic membranes were normal [No JVD] : no jugular venous distention [No Lymphadenopathy] : no lymphadenopathy [Supple] : supple [No Accessory Muscle Use] : no accessory muscle use [No Respiratory Distress] : no respiratory distress  [Clear to Auscultation] : lungs were clear to auscultation bilaterally [Normal Rate] : normal rate  [Regular Rhythm] : with a regular rhythm [No Edema] : there was no peripheral edema [No Extremity Clubbing/Cyanosis] : no extremity clubbing/cyanosis [Soft] : abdomen soft [Non Tender] : non-tender [Non-distended] : non-distended [Normal Bowel Sounds] : normal bowel sounds [Normal Anterior Cervical Nodes] : no anterior cervical lymphadenopathy [No CVA Tenderness] : no CVA  tenderness [No Joint Swelling] : no joint swelling [Normal Gait] : normal gait [No Focal Deficits] : no focal deficits [Normal Affect] : the affect was normal [Alert and Oriented x3] : oriented to person, place, and time [Normal Insight/Judgement] : insight and judgment were intact [de-identified] : seborrheic dermatitis scalp

## 2024-06-26 NOTE — PLAN
[FreeTextEntry1] : 1. Comprehensive labs reviewed, started on Vitamin D. 2. Cologuard, pt refuses colonoscopy. 3. Close f/u with cardiology, Mediterranean diet and exercise. 4. Ophthalmology and dermatology exams annually. 5. Twice yearly dental exams. 6. Routine f/u with psychiatry.  7. Awaiting CPAP. 8. Immunizations a scheduled.  9. F/U in 6 months.

## 2024-06-26 NOTE — HEALTH RISK ASSESSMENT
[Yes] : Yes [2 - 4 times a month (2 pts)] : 2-4 times a month (2 points) [1 or 2 (0 pts)] : 1 or 2 (0 points) [Never (0 pts)] : Never (0 points) [No] : In the past 12 months have you used drugs other than those required for medical reasons? No [No falls in past year] : Patient reported no falls in the past year [0] : 2) Feeling down, depressed, or hopeless: Not at all (0) [PHQ-2 Negative - No further assessment needed] : PHQ-2 Negative - No further assessment needed [HIV test declined] : HIV test declined [Hepatitis C test declined] : Hepatitis C test declined [None] : None [Alone] : lives alone [Employed] : employed [Graduate School] : graduate school [] :  [# Of Children ___] : has [unfilled] children [Fully functional (bathing, dressing, toileting, transferring, walking, feeding)] : Fully functional (bathing, dressing, toileting, transferring, walking, feeding) [Fully functional (using the telephone, shopping, preparing meals, housekeeping, doing laundry, using] : Fully functional and needs no help or supervision to perform IADLs (using the telephone, shopping, preparing meals, housekeeping, doing laundry, using transportation, managing medications and managing finances) [Never] : Never [NO] : No [Fair] : ~his/her~ current health as fair  [Good] : ~his/her~  mood as  good [Intercurrent ED visits] : went to ED [Seat Belt] :  uses seat belt [Sunscreen] : uses sunscreen [de-identified] : Cardiology, Psychiatry [Audit-CScore] : 2 [de-identified] : no formal exercise [EyeExamDate] : never [Change in mental status noted] : No change in mental status noted [Reports changes in hearing] : Reports no changes in hearing [Reports changes in vision] : Reports no changes in vision [Reports changes in dental health] : Reports no changes in dental health [ColonoscopyComments] : never [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

## 2024-06-26 NOTE — HISTORY OF PRESENT ILLNESS
[FreeTextEntry1] : Establish Care Annual Wellness [de-identified] : 64M w/ PMHx of CAD with 13 stents in situ, GERD, HLD, Obesity, Depression, Anxiety, EVY, and Seborrheic Dermatitis presents to the office to establish care with a wellness visit.  Patient is followed by cardiologist Dr. Chavis and Dr. Camara, he recently had brachytherapy.   The pt is followed by Dr. Strong for depression and anxiety.  The pt has never had a colonoscopy. He is overdue for dermatology and dental exams and has never seen an ophthalmologist.   He recently had a home polysomnogram that was consistent with EVY.   The patient reports social EtOH use, he is a never smoker and denies illicit drug use. He does not exercise and is currently trying to modify his diet to lose weight.

## 2024-07-19 ENCOUNTER — TRANSCRIPTION ENCOUNTER (OUTPATIENT)
Age: 64
End: 2024-07-19

## 2024-07-19 ENCOUNTER — OUTPATIENT (OUTPATIENT)
Dept: OUTPATIENT SERVICES | Facility: HOSPITAL | Age: 64
LOS: 1 days | End: 2024-07-19
Payer: COMMERCIAL

## 2024-07-19 VITALS
HEART RATE: 67 BPM | RESPIRATION RATE: 18 BRPM | DIASTOLIC BLOOD PRESSURE: 81 MMHG | WEIGHT: 244.93 LBS | SYSTOLIC BLOOD PRESSURE: 151 MMHG | TEMPERATURE: 99 F | HEIGHT: 75 IN | OXYGEN SATURATION: 99 %

## 2024-07-19 VITALS
TEMPERATURE: 98 F | RESPIRATION RATE: 18 BRPM | OXYGEN SATURATION: 96 % | HEART RATE: 63 BPM | SYSTOLIC BLOOD PRESSURE: 143 MMHG | DIASTOLIC BLOOD PRESSURE: 78 MMHG

## 2024-07-19 DIAGNOSIS — Z95.5 PRESENCE OF CORONARY ANGIOPLASTY IMPLANT AND GRAFT: Chronic | ICD-10-CM

## 2024-07-19 DIAGNOSIS — I20.0 UNSTABLE ANGINA: ICD-10-CM

## 2024-07-19 DIAGNOSIS — Z92.3 PERSONAL HISTORY OF IRRADIATION: Chronic | ICD-10-CM

## 2024-07-19 LAB
ANION GAP SERPL CALC-SCNC: 14 MMOL/L — SIGNIFICANT CHANGE UP (ref 5–17)
BUN SERPL-MCNC: 14 MG/DL — SIGNIFICANT CHANGE UP (ref 7–23)
CALCIUM SERPL-MCNC: 9.4 MG/DL — SIGNIFICANT CHANGE UP (ref 8.4–10.5)
CHLORIDE SERPL-SCNC: 105 MMOL/L — SIGNIFICANT CHANGE UP (ref 96–108)
CO2 SERPL-SCNC: 22 MMOL/L — SIGNIFICANT CHANGE UP (ref 22–31)
CREAT SERPL-MCNC: 0.7 MG/DL — SIGNIFICANT CHANGE UP (ref 0.5–1.3)
EGFR: 104 ML/MIN/1.73M2 — SIGNIFICANT CHANGE UP
GLUCOSE SERPL-MCNC: 96 MG/DL — SIGNIFICANT CHANGE UP (ref 70–99)
HCT VFR BLD CALC: 44.2 % — SIGNIFICANT CHANGE UP (ref 39–50)
HGB BLD-MCNC: 15.2 G/DL — SIGNIFICANT CHANGE UP (ref 13–17)
MCHC RBC-ENTMCNC: 31.3 PG — SIGNIFICANT CHANGE UP (ref 27–34)
MCHC RBC-ENTMCNC: 34.4 GM/DL — SIGNIFICANT CHANGE UP (ref 32–36)
MCV RBC AUTO: 91.1 FL — SIGNIFICANT CHANGE UP (ref 80–100)
NRBC # BLD: 0 /100 WBCS — SIGNIFICANT CHANGE UP (ref 0–0)
PLATELET # BLD AUTO: 202 K/UL — SIGNIFICANT CHANGE UP (ref 150–400)
POTASSIUM SERPL-MCNC: 3.8 MMOL/L — SIGNIFICANT CHANGE UP (ref 3.5–5.3)
POTASSIUM SERPL-SCNC: 3.8 MMOL/L — SIGNIFICANT CHANGE UP (ref 3.5–5.3)
RBC # BLD: 4.85 M/UL — SIGNIFICANT CHANGE UP (ref 4.2–5.8)
RBC # FLD: 12.4 % — SIGNIFICANT CHANGE UP (ref 10.3–14.5)
SODIUM SERPL-SCNC: 141 MMOL/L — SIGNIFICANT CHANGE UP (ref 135–145)
TROPONIN T, HIGH SENSITIVITY RESULT: 6 NG/L — SIGNIFICANT CHANGE UP (ref 0–51)
WBC # BLD: 6.79 K/UL — SIGNIFICANT CHANGE UP (ref 3.8–10.5)
WBC # FLD AUTO: 6.79 K/UL — SIGNIFICANT CHANGE UP (ref 3.8–10.5)

## 2024-07-19 PROCEDURE — C1725: CPT

## 2024-07-19 PROCEDURE — 85027 COMPLETE CBC AUTOMATED: CPT

## 2024-07-19 PROCEDURE — C9600: CPT | Mod: LD

## 2024-07-19 PROCEDURE — 93005 ELECTROCARDIOGRAM TRACING: CPT

## 2024-07-19 PROCEDURE — 92920 PRQ TRLUML C ANGIOP 1ART&/BR: CPT | Mod: LD

## 2024-07-19 PROCEDURE — 93454 CORONARY ARTERY ANGIO S&I: CPT | Mod: 26,59

## 2024-07-19 PROCEDURE — 80048 BASIC METABOLIC PNL TOTAL CA: CPT

## 2024-07-19 PROCEDURE — 80061 LIPID PANEL: CPT

## 2024-07-19 PROCEDURE — C1894: CPT

## 2024-07-19 PROCEDURE — 83036 HEMOGLOBIN GLYCOSYLATED A1C: CPT

## 2024-07-19 PROCEDURE — C1769: CPT

## 2024-07-19 PROCEDURE — 84484 ASSAY OF TROPONIN QUANT: CPT

## 2024-07-19 PROCEDURE — 93010 ELECTROCARDIOGRAM REPORT: CPT | Mod: 76

## 2024-07-19 PROCEDURE — 0523T NTRAPX C FFR W/3D FUNCJL MAP: CPT

## 2024-07-19 PROCEDURE — 99152 MOD SED SAME PHYS/QHP 5/>YRS: CPT

## 2024-07-19 PROCEDURE — C1874: CPT

## 2024-07-19 PROCEDURE — C1887: CPT

## 2024-07-19 PROCEDURE — 93454 CORONARY ARTERY ANGIO S&I: CPT | Mod: 59

## 2024-07-19 PROCEDURE — 92928 PRQ TCAT PLMT NTRAC ST 1 LES: CPT | Mod: LC

## 2024-07-19 RX ORDER — METOPROLOL TARTRATE 50 MG
50 TABLET ORAL DAILY
Refills: 0 | Status: ACTIVE | OUTPATIENT
Start: 2024-07-19 | End: 2025-06-17

## 2024-07-19 RX ORDER — CLOPIDOGREL BISULFATE 75 MG/1
75 TABLET, FILM COATED ORAL DAILY
Refills: 0 | Status: ACTIVE | OUTPATIENT
Start: 2024-07-20 | End: 2025-06-18

## 2024-07-19 RX ORDER — FENTANYL CITRATE 50 UG/ML
25 INJECTION, SOLUTION INTRAMUSCULAR; INTRAVENOUS ONCE
Refills: 0 | Status: DISCONTINUED | OUTPATIENT
Start: 2024-07-19 | End: 2024-07-19

## 2024-07-19 RX ORDER — SODIUM CHLORIDE 0.9 % (FLUSH) 0.9 %
250 SYRINGE (ML) INJECTION ONCE
Refills: 0 | Status: COMPLETED | OUTPATIENT
Start: 2024-07-19 | End: 2024-07-19

## 2024-07-19 RX ORDER — VENLAFAXINE 37.5 MG/1
150 CAPSULE, EXTENDED RELEASE ORAL DAILY
Refills: 0 | Status: ACTIVE | OUTPATIENT
Start: 2024-07-20 | End: 2025-06-18

## 2024-07-19 RX ORDER — LAMOTRIGINE 100 MG/1
200 TABLET ORAL DAILY
Refills: 0 | Status: ACTIVE | OUTPATIENT
Start: 2024-07-20 | End: 2025-06-18

## 2024-07-19 RX ORDER — ASPIRIN 325 MG/1
81 TABLET, FILM COATED ORAL DAILY
Refills: 0 | Status: ACTIVE | OUTPATIENT
Start: 2024-07-19 | End: 2025-06-17

## 2024-07-19 RX ORDER — ATORVASTATIN CALCIUM 20 MG/1
80 TABLET, FILM COATED ORAL AT BEDTIME
Refills: 0 | Status: ACTIVE | OUTPATIENT
Start: 2024-07-19 | End: 2025-06-17

## 2024-07-19 RX ORDER — SODIUM CHLORIDE 0.9 % (FLUSH) 0.9 %
1000 SYRINGE (ML) INJECTION
Refills: 0 | Status: ACTIVE | OUTPATIENT
Start: 2024-07-19 | End: 2024-07-19

## 2024-07-19 RX ADMIN — Medication 500 MILLILITER(S): at 13:28

## 2024-07-19 RX ADMIN — FENTANYL CITRATE 25 MICROGRAM(S): 50 INJECTION, SOLUTION INTRAMUSCULAR; INTRAVENOUS at 17:16

## 2024-07-19 RX ADMIN — Medication 75 MILLILITER(S): at 13:28

## 2024-07-19 RX ADMIN — FENTANYL CITRATE 25 MICROGRAM(S): 50 INJECTION, SOLUTION INTRAMUSCULAR; INTRAVENOUS at 17:45

## 2024-07-19 RX ADMIN — Medication 75 MILLILITER(S): at 17:17

## 2024-07-19 NOTE — H&P CARDIOLOGY - NSICDXPASTSURGICALHX_GEN_ALL_CORE_FT
PAST SURGICAL HISTORY:  History of brachytherapy     History of coronary artery stent placement

## 2024-07-19 NOTE — ASU DISCHARGE PLAN (ADULT/PEDIATRIC) - CARE PROVIDER_API CALL
Demetrius Chavis  Cardiovascular Disease  [address] [place] [address]  Phone: [phone]  Fax: [phone]  Follow Up Time: 2 weeks

## 2024-07-19 NOTE — CHART NOTE - NSCHARTNOTEFT_GEN_A_CORE
CJ OCONNELL    Patient referred to Cardiac Rehab s/p PCI to OM DESx1, POBA to LAD via RFA.             *Education on benefits of Cardiac Rehab provided to patient: Yes         *Referral and Prescription Given for Cardiac Rehab : Yes         *Pt given list of locations & instructed to contact their insurance company to review list of participating providers: Yes         *Pt instructed to bring Cardiac Rehab prescription with them to Cardiology Follow up appointment for assistance with enrollment: Yes         *Pt discharged with copies detail cardiovascular history, medications, testing/treatments: Yes.    07-19-24 @ 16:46
Removal of Femoral Sheath    Pulses in the right leg is palpable.   The patient was placed in the supine position. The insertion site was identified and the sutures were removed per protocol.    The _6__ [place] femoral sheath was then removed.   Direct pressure was applied for  __20____ minutes.   Complications: None, VSS, Good Hemostasis.     Monitoring of the right groin and both lower extremities including neuro-vascular checks and vital signs every 15 minutes x 4, then every 30 minutes x 2, then every 1 hour was ordered.    Plan: continue to monitor site closely, may sit up at 1930 and get OOB at 2130 and discharge home tonight if cath site and VS remains stable post recovery and ambulation  Continue all home meds including ASA, Plavix, Statin,   Pt will follow up with his cardiologist dr Partha Romo in 1-2weeks.

## 2024-07-19 NOTE — H&P CARDIOLOGY - HISTORY OF PRESENT ILLNESS
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 Evaluated by cardiologist and referred for LHC in setting of progressive symptoms.  Referring cardiologist: Dr. Demetrius Chavis

## 2024-07-19 NOTE — H&P CARDIOLOGY - NSICDXPASTMEDICALHX_GEN_ALL_CORE_FT
PAST MEDICAL HISTORY:  CAD (coronary artery disease)     GERD (gastroesophageal reflux disease)     H/O: depression     HLD (hyperlipidemia)     HTN (hypertension)     STEMI (ST elevation myocardial infarction) at 47 y/o

## 2024-07-19 NOTE — H&P CARDIOLOGY - VASCULAR
Alert-The patient is alert, awake and responds to voice. The patient is oriented to time, place, and person. The triage nurse is able to obtain subjective information.
detailed exam

## 2024-07-19 NOTE — ASU DISCHARGE PLAN (ADULT/PEDIATRIC) - CALL YOUR DOCTOR IF YOU HAVE ANY OF THE FOLLOWING:
Swelling that gets worse/Pain not relieved by Medications/Wound/Surgical Site with redness, or foul smelling discharge or pus/Numbness, tingling, color or temperature change to extremity

## 2024-07-19 NOTE — ASU DISCHARGE PLAN (ADULT/PEDIATRIC) - ASU DC SPECIAL INSTRUCTIONSFT
Wound Care:     The day AFTER your procedure remove bandage GENTLY, and clean using  mild soap and gentle warm, water stream, pat dry, leave OPEN to air.  YOU MAY SHOWER.   DO NOT apply lotions, creams, ointments, powder, parfumes to your incision site.  DO NOT SOAK your site for 1 week (no baths, no pools, no tubs, etc...)  Check  your groin and /or wrist daily. 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 operate heavy machineries   - you may resume sexual activity in 48 hours, unless otherwise instructed by your cardiologist     Your procedure was done through the [facility]: for the NEXT 5 DAYS  - Limit climbing stairs, DO NOT soak in bathtub or pool  - no strenuous activities, pushing, pulling, straining  - Do not lift anything 10 Lbs or heavier     MEDICATION:   take your medications as explained (see discharge paperwork)   If you received a STENT, you will be taking antiplatelet medications to KEEP YOUR STENT OPEN (eg: Aspirin, Plavix, Brilinta, Effient, etc).  Take as prescribed DO NOT STOP taking them without consulting with your cardiologist first.     Follow heart healthy diet recommended by your doctor, if you smoke STOP SMOKING (may call [phone] for center of tobacco control if you need assistance)     CALL your doctor to make appointment in 2 WEEKS     ***CALL YOUR DOCTOR***  if you experience: fever, chills, body aches, or severe pain, swelling, redness, heat or yellow discharge at incision site  If you experience Bleeding or excruciating pain at the procedural site, swelling (golf ball size) at your procedural site  If you experience CHEST PAIN  If you experience extremity numbness, tingling, temperature change (of your procedural site)   If you are unable to reach your doctor, you may contact:   -Cardiology Office at [facility] at [phone] or   - [facility] [phone]  - [facility] [phone]

## 2024-07-20 LAB
A1C WITH ESTIMATED AVERAGE GLUCOSE RESULT: 5.2 % — SIGNIFICANT CHANGE UP (ref 4–5.6)
ESTIMATED AVERAGE GLUCOSE: 103 MG/DL — SIGNIFICANT CHANGE UP (ref 68–114)

## 2024-07-29 PROBLEM — E78.5 HYPERLIPIDEMIA, UNSPECIFIED: Chronic | Status: ACTIVE | Noted: 2024-07-19

## 2024-07-29 PROBLEM — K21.9 GASTRO-ESOPHAGEAL REFLUX DISEASE WITHOUT ESOPHAGITIS: Chronic | Status: ACTIVE | Noted: 2024-07-19

## 2024-08-01 ENCOUNTER — NON-APPOINTMENT (OUTPATIENT)
Age: 64
End: 2024-08-01

## 2024-08-01 ENCOUNTER — APPOINTMENT (OUTPATIENT)
Dept: DERMATOLOGY | Facility: CLINIC | Age: 64
End: 2024-08-01
Payer: COMMERCIAL

## 2024-08-01 DIAGNOSIS — L82.1 OTHER SEBORRHEIC KERATOSIS: ICD-10-CM

## 2024-08-01 DIAGNOSIS — L82.0 INFLAMED SEBORRHEIC KERATOSIS: ICD-10-CM

## 2024-08-01 DIAGNOSIS — D18.01 HEMANGIOMA OF SKIN AND SUBCUTANEOUS TISSUE: ICD-10-CM

## 2024-08-01 DIAGNOSIS — L81.4 OTHER MELANIN HYPERPIGMENTATION: ICD-10-CM

## 2024-08-01 PROCEDURE — 17110 DESTRUCTION B9 LES UP TO 14: CPT

## 2024-08-01 PROCEDURE — 99203 OFFICE O/P NEW LOW 30 MIN: CPT | Mod: 25

## 2024-08-01 RX ORDER — ASPIRIN 81 MG
81 TABLET,CHEWABLE ORAL
Refills: 0 | Status: ACTIVE | COMMUNITY

## 2024-08-01 NOTE — PHYSICAL EXAM
[Alert] : alert [Oriented x 3] : ~L oriented x 3 [Well Nourished] : well nourished [Full Body Skin Exam Performed] : performed [FreeTextEntry3] : A full skin exam was performed including the scalp, face (including lips, ears, nose and eyes), neck, chest, abdomen, back, buttocks, upper extremities and lower extremities.  The patient declined examination of the genitalia.   The exam revealed the following benign growths: Seborrheic keratoses. Lentigines. Cherry angioma.  Crusted erythematous tan papule, right cheek.

## 2024-08-14 NOTE — ED ADULT TRIAGE NOTE - CHIEF COMPLAINT QUOTE
pt BIBEMS from home c/o chest pressure. prenotif from [facility], hx of CAD, 12 stents, cath x3 weeks ago. given 1mg nitro PTA, chest pain improved. L AC 18g placed PTA. Endorses start of chest discomfort approx 45 min. EKG in progress
Initial (On Arrival)

## 2024-08-16 RX ORDER — LEVOFLOXACIN 500 MG/1
500 TABLET, FILM COATED ORAL DAILY
Qty: 10 | Refills: 0 | Status: ACTIVE | COMMUNITY
Start: 2024-08-16 | End: 1900-01-01

## 2024-10-03 DIAGNOSIS — N52.9 MALE ERECTILE DYSFUNCTION, UNSPECIFIED: ICD-10-CM

## 2024-10-03 RX ORDER — SILDENAFIL 50 MG/1
50 TABLET ORAL
Qty: 30 | Refills: 5 | Status: ACTIVE | COMMUNITY
Start: 2024-10-03 | End: 1900-01-01

## 2024-11-18 ENCOUNTER — TRANSCRIPTION ENCOUNTER (OUTPATIENT)
Age: 64
End: 2024-11-18

## 2024-11-18 ENCOUNTER — INPATIENT (INPATIENT)
Facility: HOSPITAL | Age: 64
LOS: 0 days | Discharge: HOME | DRG: 322 | End: 2024-11-19
Attending: INTERNAL MEDICINE | Admitting: INTERNAL MEDICINE
Payer: COMMERCIAL

## 2024-11-18 VITALS
OXYGEN SATURATION: 96 % | DIASTOLIC BLOOD PRESSURE: 69 MMHG | TEMPERATURE: 99 F | HEIGHT: 75 IN | SYSTOLIC BLOOD PRESSURE: 149 MMHG | HEART RATE: 73 BPM | RESPIRATION RATE: 20 BRPM | WEIGHT: 250 LBS

## 2024-11-18 DIAGNOSIS — R07.9 CHEST PAIN, UNSPECIFIED: ICD-10-CM

## 2024-11-18 DIAGNOSIS — Z95.5 PRESENCE OF CORONARY ANGIOPLASTY IMPLANT AND GRAFT: Chronic | ICD-10-CM

## 2024-11-18 DIAGNOSIS — Z92.3 PERSONAL HISTORY OF IRRADIATION: Chronic | ICD-10-CM

## 2024-11-18 LAB
ALBUMIN SERPL ELPH-MCNC: 4.1 G/DL — SIGNIFICANT CHANGE UP (ref 3.3–5)
ALP SERPL-CCNC: 93 U/L — SIGNIFICANT CHANGE UP (ref 40–120)
ALT FLD-CCNC: 25 U/L — SIGNIFICANT CHANGE UP (ref 10–45)
ANION GAP SERPL CALC-SCNC: 12 MMOL/L — SIGNIFICANT CHANGE UP (ref 5–17)
AST SERPL-CCNC: 16 U/L — SIGNIFICANT CHANGE UP (ref 10–40)
BASE EXCESS BLDV CALC-SCNC: 1.2 MMOL/L — SIGNIFICANT CHANGE UP (ref -2–3)
BASOPHILS # BLD AUTO: 0.04 K/UL — SIGNIFICANT CHANGE UP (ref 0–0.2)
BASOPHILS NFR BLD AUTO: 0.6 % — SIGNIFICANT CHANGE UP (ref 0–2)
BILIRUB SERPL-MCNC: 0.6 MG/DL — SIGNIFICANT CHANGE UP (ref 0.2–1.2)
BUN SERPL-MCNC: 20 MG/DL — SIGNIFICANT CHANGE UP (ref 7–23)
CALCIUM SERPL-MCNC: 9.2 MG/DL — SIGNIFICANT CHANGE UP (ref 8.4–10.5)
CHLORIDE SERPL-SCNC: 107 MMOL/L — SIGNIFICANT CHANGE UP (ref 96–108)
CO2 BLDV-SCNC: 29 MMOL/L — HIGH (ref 22–26)
CO2 SERPL-SCNC: 24 MMOL/L — SIGNIFICANT CHANGE UP (ref 22–31)
CREAT SERPL-MCNC: 0.78 MG/DL — SIGNIFICANT CHANGE UP (ref 0.5–1.3)
EGFR: 100 ML/MIN/1.73M2 — SIGNIFICANT CHANGE UP
EOSINOPHIL # BLD AUTO: 0.11 K/UL — SIGNIFICANT CHANGE UP (ref 0–0.5)
EOSINOPHIL NFR BLD AUTO: 1.6 % — SIGNIFICANT CHANGE UP (ref 0–6)
GAS PNL BLDV: SIGNIFICANT CHANGE UP
GLUCOSE SERPL-MCNC: 105 MG/DL — HIGH (ref 70–99)
HCO3 BLDV-SCNC: 28 MMOL/L — SIGNIFICANT CHANGE UP (ref 22–29)
HCT VFR BLD CALC: 46.4 % — SIGNIFICANT CHANGE UP (ref 39–50)
HGB BLD-MCNC: 15.7 G/DL — SIGNIFICANT CHANGE UP (ref 13–17)
IMM GRANULOCYTES NFR BLD AUTO: 0.3 % — SIGNIFICANT CHANGE UP (ref 0–0.9)
LYMPHOCYTES # BLD AUTO: 1.6 K/UL — SIGNIFICANT CHANGE UP (ref 1–3.3)
LYMPHOCYTES # BLD AUTO: 23.9 % — SIGNIFICANT CHANGE UP (ref 13–44)
MCHC RBC-ENTMCNC: 31.2 PG — SIGNIFICANT CHANGE UP (ref 27–34)
MCHC RBC-ENTMCNC: 33.8 G/DL — SIGNIFICANT CHANGE UP (ref 32–36)
MCV RBC AUTO: 92.1 FL — SIGNIFICANT CHANGE UP (ref 80–100)
MONOCYTES # BLD AUTO: 0.46 K/UL — SIGNIFICANT CHANGE UP (ref 0–0.9)
MONOCYTES NFR BLD AUTO: 6.9 % — SIGNIFICANT CHANGE UP (ref 2–14)
NEUTROPHILS # BLD AUTO: 4.47 K/UL — SIGNIFICANT CHANGE UP (ref 1.8–7.4)
NEUTROPHILS NFR BLD AUTO: 66.7 % — SIGNIFICANT CHANGE UP (ref 43–77)
NRBC # BLD: 0 /100 WBCS — SIGNIFICANT CHANGE UP (ref 0–0)
NT-PROBNP SERPL-SCNC: <36 PG/ML — SIGNIFICANT CHANGE UP (ref 0–300)
PCO2 BLDV: 50 MMHG — SIGNIFICANT CHANGE UP (ref 42–55)
PH BLDV: 7.35 — SIGNIFICANT CHANGE UP (ref 7.32–7.43)
PLATELET # BLD AUTO: 206 K/UL — SIGNIFICANT CHANGE UP (ref 150–400)
PO2 BLDV: 38 MMHG — SIGNIFICANT CHANGE UP (ref 25–45)
POTASSIUM SERPL-MCNC: 4.1 MMOL/L — SIGNIFICANT CHANGE UP (ref 3.5–5.3)
POTASSIUM SERPL-SCNC: 4.1 MMOL/L — SIGNIFICANT CHANGE UP (ref 3.5–5.3)
PROT SERPL-MCNC: 7.2 G/DL — SIGNIFICANT CHANGE UP (ref 6–8.3)
RBC # BLD: 5.04 M/UL — SIGNIFICANT CHANGE UP (ref 4.2–5.8)
RBC # FLD: 12.5 % — SIGNIFICANT CHANGE UP (ref 10.3–14.5)
SAO2 % BLDV: 59.8 % — LOW (ref 67–88)
SODIUM SERPL-SCNC: 143 MMOL/L — SIGNIFICANT CHANGE UP (ref 135–145)
TROPONIN T, HIGH SENSITIVITY RESULT: <6 NG/L — SIGNIFICANT CHANGE UP (ref 0–51)
WBC # BLD: 6.7 K/UL — SIGNIFICANT CHANGE UP (ref 3.8–10.5)
WBC # FLD AUTO: 6.7 K/UL — SIGNIFICANT CHANGE UP (ref 3.8–10.5)

## 2024-11-18 PROCEDURE — 99285 EMERGENCY DEPT VISIT HI MDM: CPT

## 2024-11-18 PROCEDURE — 93454 CORONARY ARTERY ANGIO S&I: CPT | Mod: 26,59

## 2024-11-18 PROCEDURE — 71046 X-RAY EXAM CHEST 2 VIEWS: CPT | Mod: 26

## 2024-11-18 PROCEDURE — 99152 MOD SED SAME PHYS/QHP 5/>YRS: CPT

## 2024-11-18 PROCEDURE — 92928 PRQ TCAT PLMT NTRAC ST 1 LES: CPT | Mod: LD

## 2024-11-18 RX ORDER — VENLAFAXINE HYDROCHLORIDE 75 MG/1
150 CAPSULE, EXTENDED RELEASE ORAL DAILY
Refills: 0 | Status: DISCONTINUED | OUTPATIENT
Start: 2024-11-18 | End: 2024-11-19

## 2024-11-18 RX ORDER — LAMOTRIGINE 50 MG/1
200 TABLET, EXTENDED RELEASE ORAL DAILY
Refills: 0 | Status: DISCONTINUED | OUTPATIENT
Start: 2024-11-18 | End: 2024-11-19

## 2024-11-18 RX ORDER — SODIUM CHLORIDE 9 MG/ML
1000 INJECTION, SOLUTION INTRAMUSCULAR; INTRAVENOUS; SUBCUTANEOUS
Refills: 0 | Status: DISCONTINUED | OUTPATIENT
Start: 2024-11-18 | End: 2024-11-19

## 2024-11-18 RX ORDER — CLOPIDOGREL 75 MG/1
75 TABLET, FILM COATED ORAL DAILY
Refills: 0 | Status: DISCONTINUED | OUTPATIENT
Start: 2024-11-19 | End: 2024-11-19

## 2024-11-18 RX ORDER — METOPROLOL TARTRATE 100 MG/1
50 TABLET, FILM COATED ORAL DAILY
Refills: 0 | Status: DISCONTINUED | OUTPATIENT
Start: 2024-11-18 | End: 2024-11-19

## 2024-11-18 RX ADMIN — SODIUM CHLORIDE 75 MILLILITER(S): 9 INJECTION, SOLUTION INTRAMUSCULAR; INTRAVENOUS; SUBCUTANEOUS at 18:20

## 2024-11-18 RX ADMIN — VENLAFAXINE HYDROCHLORIDE 150 MILLIGRAM(S): 75 CAPSULE, EXTENDED RELEASE ORAL at 21:10

## 2024-11-18 RX ADMIN — Medication 80 MILLIGRAM(S): at 21:09

## 2024-11-18 RX ADMIN — METOPROLOL TARTRATE 50 MILLIGRAM(S): 100 TABLET, FILM COATED ORAL at 21:09

## 2024-11-18 NOTE — ED ADULT NURSE NOTE - NSICDXPASTMEDICALHX_GEN_ALL_CORE_FT
PAST MEDICAL HISTORY:  CAD (coronary artery disease)     GERD (gastroesophageal reflux disease)     H/O: depression     HLD (hyperlipidemia)     HTN (hypertension)     STEMI (ST elevation myocardial infarction) at 49 y/o

## 2024-11-18 NOTE — DISCHARGE NOTE PROVIDER - NSDCCPCAREPLAN_GEN_ALL_CORE_FT
PRINCIPAL DISCHARGE DIAGNOSIS  Diagnosis: CAD (coronary artery disease)  Assessment and Plan of Treatment: You have a history of coronary artery disease. Your disease is stable and you do not exhibit any symptoms such as chest pain or shortness of breath on exertion. Please continue to take your prescribed medication. Maintain a low fat and low sugar diet. Please follow up with your primary care physician/Cardiologist routinely to monitor your lipid profile, blood pressure, and blood sugar.      SECONDARY DISCHARGE DIAGNOSES  Diagnosis: Encounter for cardiac rehabilitation  Assessment and Plan of Treatment: We have provided you with a prescription for cardiac rehab which is medically supervised exercise program for your heart and has been shown to improve the quantity and quality of life of people with heart disease like yours. You should attend cardiac rehab 3 times per week for 12 weeks. We have provided you with a list of nearby facilities. Please call your insurance carrier to determine which of these facilities are covered under your plan. Please bring this prescription with you to your follow up appointment with your cardiologist who can then further assist you to enroll into a cardiac rehab program.     PRINCIPAL DISCHARGE DIAGNOSIS  Diagnosis: CAD (coronary artery disease)  Assessment and Plan of Treatment: You have a history of coronary artery disease. Your disease is stable and you do not exhibit any symptoms such as chest pain or shortness of breath on exertion. Please continue to take your prescribed medication. Maintain a low fat and low sugar diet. Please follow up with your primary care physician/Cardiologist routinely to monitor your lipid profile, blood pressure, and blood sugar.      SECONDARY DISCHARGE DIAGNOSES  Diagnosis: Encounter for cardiac rehabilitation  Assessment and Plan of Treatment: We have provided you with a prescription for cardiac rehab which is medically supervised exercise program for your heart and has been shown to improve the quantity and quality of life of people with heart disease like yours. You should attend cardiac rehab 3 times per week for 12 weeks. We have provided you with a list of nearby facilities. Please call your insurance carrier to determine which of these facilities are covered under your plan. Please bring this prescription with you to your follow up appointment with your cardiologist who can then further assist you to enroll into a cardiac rehab program.    Diagnosis: HLD (hyperlipidemia)  Assessment and Plan of Treatment: Continue with your cholesterol medications. Eat a heart healthy diet that is low in saturated fats and salt, and includes whole grains, fruits, vegetables and lean protein; exercise regularly (consult with your physician or cardiologist first); maintain a heart healthy weight; if you smoke - quit (A resource to help you stop smoking is the [facility] – phone number [phone].). Continue to follow with your primary physician or cardiologist.

## 2024-11-18 NOTE — DISCHARGE NOTE PROVIDER - CARE PROVIDER_API CALL
Demetrius Chavis  Cardiovascular Disease  [address] [place] [address]  Phone: [phone]  Fax: [phone]  Follow Up Time: 2 weeks

## 2024-11-18 NOTE — PATIENT PROFILE ADULT - FALL HARM RISK - UNIVERSAL INTERVENTIONS
Bed in lowest position, wheels locked, appropriate side rails in place/Call bell, personal items and telephone in reach/Instruct patient to call for assistance before getting out of bed or chair/Non-slip footwear when patient is out of bed/[place] to call system/Physically safe environment - no spills, clutter or unnecessary equipment/Purposeful Proactive Rounding/Room/bathroom lighting operational, light cord in reach

## 2024-11-18 NOTE — DISCHARGE NOTE PROVIDER - NSDCFUADDAPPT_GEN_ALL_CORE_FT
You can have your echo done outpt with Dr. Chavis.  Call for an appointment for 2 weeks post discharge.

## 2024-11-18 NOTE — ED PROVIDER NOTE - PHYSICAL EXAMINATION
GENERAL: NAD  HEENT:  Atraumatic  CHEST/LUNG: Chest rise equal bilaterally  HEART: Regular rate and rhythm  EXTREMITIES:  Extremities warm  PSYCH: A&Ox3  SKIN: No obvious rashes or lesions

## 2024-11-18 NOTE — PATIENT PROFILE ADULT - MONEY FOR FOOD
Patient: Bj Barnhart Date: 2020   : 1959 Attending: Maria Fernanda Giles MD   61 year old male      [facility]    PROCEDURE ASSESSMENT   (LOCAL ANESTHESIA - Not for use with Conscious Sedation)    Preop Diagnosis / Indications for Procedure: Multiple lipomas    Planned Procedure: Lipoma excisions    Planned Anesthetic:  local      MEDICAL HISTORY / COMORBID CONDITIONS:  Significant medical / surgical history: no    Normal mental status:   yes       EXAMINATION PERTINENT TO PROCEDURE BEING PERFORMED  Evaluation of operative site Yes      OTHER FINDINGS  Reviewed current medications and allergies yes      PERTINENT LAB / DIAGNOSTIC TESTS  NONE      INFORMED CONSENT  Consent obtained: yes    Risks / benefits, complications, and alternatives explained, questions answered and patient / personal representative agrees to:  procedure listed above and anesthetic listed above    Nikky Reynaga PA-C.  20   no

## 2024-11-18 NOTE — ED PROVIDER NOTE - OBJECTIVE STATEMENT
64-year-old male patient past medical history 14 stents, CAD, hypertension, hyperlipidemia presents to ED for exertional chest pain with associated shortness of breath x 1 week.  Patient states he started experiencing nonradiating chest pain while walking his dog this morning, and walking out of the [place]. Denies cough, no fevers, no chills, no body aches. Pt was here 3months ago and got catherization. Pt's last echo 9/23 EF 65-70%.

## 2024-11-18 NOTE — H&P CARDIOLOGY - HISTORY OF PRESENT ILLNESS
64 year old male with PMHx of CAD s/p 14 stents and brachytherapy, HLD, GERD, anxiety, depression, EVY, who is presenting to the ED today with worsening chest pain consistent with stable angina, but not response to current antianginal medications. Two weeks ago while walking he noticed increasing SOB which was new from prior and decreased exercise tolerance. This past week noticed L sided chest pressure 8/10 that radiated to his L arm while walking 1/4 mile. Relieved after 4-5 mins of rest. Felt similar to prior episodes of chest pain. Patient now here for [facility] w/ Dr. Camara today to assess patency of stents. Denies chest pain, palpitations, SOB, dizziness.    Cards: Demetrius Chavis

## 2024-11-18 NOTE — CONSULT NOTE ADULT - ASSESSMENT
65 yo man with history of CAD s/p PCI, HTN, HLD, GERD, depression, and anxiety presenting with exertional chest pain and SOB x1 week.     EKG  Trop   63 yo man with history of CAD s/p PCI, HTN, HLD, GERD, depression, and anxiety presenting with exertional chest pain and SOB x1 week.     EKG SR without ST changes. Similar to prior   Trop pending     CP consistent with stable angina, but given that it is worsening, limiting functional status, and is not responsive to current antianginal medications it would be prudent to perform a LHC to assess patency of stents.     Recommendations:  -check TTE  -check trop, CK, CKMB  -continue with DAPT (asa and plavix)  -continue with high intensity statin   -continue with BB   -plan for LHC today   -admit under Dr. Chet Camara     Please see attending attestation for final recommendations.     Nadeem Fuentes MD  Cardiology Fellow

## 2024-11-18 NOTE — PATIENT PROFILE ADULT - VISION (WITH CORRECTIVE LENSES IF THE PATIENT USUALLY WEARS THEM):
POST-OP DIAGNOSIS:  Urinary, incontinence, stress female 13-Mar-2019 09:17:30  Mary Caldwell Normal vision: sees adequately in most situations; can see medication labels, newsprint

## 2024-11-18 NOTE — ED ADULT TRIAGE NOTE - CHIEF COMPLAINT QUOTE
chest pain for 1 day hx of 13 cardiac stents last cardiac catheterization was 3 months ago sent in by DR Hoa Urena SOB, Denies current chest pain states it gets worse with exertion

## 2024-11-18 NOTE — DISCHARGE NOTE PROVIDER - NSDCMRMEDTOKEN_GEN_ALL_CORE_FT
aspirin 81 mg oral delayed release tablet: 1 tab(s) orally once a day  cardiac rehab referral: 2-3 times a week for 12 weeks MDD: 1  Effexor  mg oral capsule, extended release: 1 cap(s) orally once a day  lamoTRIgine 200 mg oral tablet: 1 tab(s) orally once a day  Lipitor 80 mg oral tablet: 1 tab(s) orally once a day  metoprolol succinate 50 mg oral tablet, extended release: 1 tab(s) orally once a day  Plavix 75 mg oral tablet: 1 tab(s) orally once a day

## 2024-11-18 NOTE — DISCHARGE NOTE PROVIDER - NSDCCPTREATMENT_GEN_ALL_CORE_FT
PRINCIPAL PROCEDURE  Procedure: Left heart cardiac catheterization  Findings and Treatment: 11/18 s/p ALEX x1 to mid LAD, POBA to distal LAD via RFA with Dr. Camara

## 2024-11-18 NOTE — CONSULT NOTE ADULT - SUBJECTIVE AND OBJECTIVE BOX
Patient seen and evaluated at bedside    Chief Complaint:    HPI:      PMHx:   CAD (coronary artery disease)    HTN (hypertension)    H/O: depression    STEMI (ST elevation myocardial infarction)    HLD (hyperlipidemia)    GERD (gastroesophageal reflux disease)        PSHx:   History of coronary artery stent placement    History of brachytherapy        Allergies:  No Known Allergies      Home Meds:    Current Medications:       FAMILY HISTORY:  Family history of CABG (Father)        Social History:  Smoking History:  Alcohol Use:  Drug Use:    REVIEW OF SYSTEMS:  CONSTITUTIONAL: No weakness, fevers or chills  EYES/ENT: No visual changes;  No dysphagia  NECK: No pain or stiffness  RESPIRATORY: No cough, wheezing, hemoptysis; No shortness of breath  CARDIOVASCULAR: No chest pain or palpitations; No lower extremity edema  GASTROINTESTINAL: No abdominal or epigastric pain. No nausea, vomiting, or hematemesis; No diarrhea or constipation. No melena or hematochezia.  BACK: No back pain  GENITOURINARY: No dysuria, frequency or hematuria  NEUROLOGICAL: No numbness or weakness  SKIN: No itching, burning, rashes, or lesions   All other review of systems is negative unless indicated above.    Physical Exam:  T(F): 98.3 (11-18), Max: 98.6 (11-18)  HR: 70 (11-18) (70 - 73)  BP: 122/83 (11-18) (122/83 - 149/69)  RR: 19 (11-18)  SpO2: 98% (11-18)  GENERAL: No acute distress, well-developed  HEAD:  Atraumatic, Normocephalic  ENT: EOMI, PERRLA, conjunctiva and sclera clear, Neck supple, No JVD, moist mucosa  CHEST/LUNG: Clear to auscultation bilaterally; No wheeze, equal breath sounds bilaterally   BACK: No spinal tenderness  HEART: Regular rate and rhythm; No murmurs, rubs, or gallops  ABDOMEN: Soft, Nontender, Nondistended; Bowel sounds present  EXTREMITIES:  No clubbing, cyanosis, or edema  PSYCH: Nl behavior, nl affect  NEUROLOGY: AAOx3, non-focal, cranial nerves intact  SKIN: Normal color, No rashes or lesions  LINES:      Imaging:    CXR: Personally reviewed    Labs: Personally reviewed      TTE 9/2023   Impression   Summary   The mitral valve leaflets appear thin and normal.   Trace mitral regurgitation is present.   The aortic valve is trileaflet with thin pliable leaflets.   The tricuspid valve leaflets are thin and pliable; valve motion is   normal.   Trace tricuspid valve regurgitation is present.   Normal appearing left atrium.   The left ventricle is normal in size, wall thickness, wall motion and   contractility.   Estimated left ventricular ejection fraction is 65-70 %.   Normal appearing right atrium.    CATH 7/19/24  Coronary Angiography   Left Coronary System:   A catheter was positioned into the vessel ostia under fluoroscopic  guidance. Angiograms were obtained in multiple views.  Right Coronary System:   A catheter was positioned into the vessel ostia under fluoroscopic  guidance. Angiograms were obtained in multiple views.    Diagnostic Findings:     Coronary Angiography   The coronary circulation is right dominant.      LM   Left main artery: Angiography shows no disease.      LAD   Mid left anterior descending: There is an 80 % stenosis. Distal left  anterior descending: There is a 100 % stenosis.    CX   Second obtuse marginal: There is an 80 % stenosis. FFR was performed  with a calculated value of 0.65. Based on the results,  the lesion was judged to be significant and an intervention was  performed.    RCA   Right coronary artery: Angiography shows mild atherosclerosis.      Left Heart Cath   [facility] performed: Aortic valve crossed and left ventricular pressures  were obtained.    Conclusions:   Successful PCI with balloon angioplsaty of mid LAD restenosis and ALEX  to OM2 (FFR 0.65).  Apical LAD is occluded.    Patent stents in LCX and RCA.  DAPT indefinitely          Patient seen and evaluated at bedside    Chief Complaint: CP    HPI:  65 yo man with history of CAD s/p 14 stents and brachytherapy, GERD, anxiety, depression, EVY, and HLD who is presenting with CP.   2 weeks ago while walking he noticed increasing SOB which was new from prior and decreased exercise tolerance. This past week noticed L sided chest pain, pressure 8/10 that radiated to his L arm while walking 1/4 mile. Relieved after 4-5 mins of rest. Felt similar to prior episodes of chest pain. Denies infectious symptoms, recent travel, LE swelling, nausea, vomiting, presyncope sx, and palpitations. No orthopnea. Says he was recently diagnosed with sleep apnea, but has not recieved his CPAP yet. No smoking.     PMHx:   CAD (coronary artery disease)  HTN (hypertension)  H/O: depression  STEMI (ST elevation myocardial infarction)  HLD (hyperlipidemia)  GERD (gastroesophageal reflux disease)        PSHx:   History of coronary artery stent placement    History of brachytherapy        Allergies:  No Known Allergies      Home Meds:    Current Medications:       FAMILY HISTORY:  Family history of CABG (Father)      REVIEW OF SYSTEMS:  CONSTITUTIONAL: No weakness, fevers or chills  EYES/ENT: No visual changes;  No dysphagia  NECK: No pain or stiffness  RESPIRATORY: No cough, wheezing, hemoptysis; No shortness of breath  CARDIOVASCULAR: No chest pain or palpitations; No lower extremity edema  GASTROINTESTINAL: No abdominal or epigastric pain. No nausea, vomiting, or hematemesis; No diarrhea or constipation. No melena or hematochezia.  BACK: No back pain  GENITOURINARY: No dysuria, frequency or hematuria  NEUROLOGICAL: No numbness or weakness  SKIN: No itching, burning, rashes, or lesions   All other review of systems is negative unless indicated above.    Physical Exam:  T(F): 98.3 (11-18), Max: 98.6 (11-18)  HR: 70 (11-18) (70 - 73)  BP: 122/83 (11-18) (122/83 - 149/69)  RR: 19 (11-18)  SpO2: 98% (11-18)    GENERAL: No acute distress, well-developed  CHEST/LUNG: Clear to auscultation bilaterally; No wheeze, equal breath sounds bilaterally   BACK: No spinal tenderness  HEART: Regular rate and rhythm; No murmurs, rubs, or gallops  ABDOMEN: Soft, Nontender, Nondistended; Bowel sounds present  EXTREMITIES:  No clubbing, cyanosis, or edema  PSYCH: Nl behavior, nl affect  NEUROLOGY: AAOx3, non-focal, cranial nerves intact  SKIN: Normal color, No rashes or lesions  LINES:      Imaging:    CXR: Personally reviewed    Labs: Personally reviewed      TTE 9/2023   Impression   Summary   The mitral valve leaflets appear thin and normal.   Trace mitral regurgitation is present.   The aortic valve is trileaflet with thin pliable leaflets.   The tricuspid valve leaflets are thin and pliable; valve motion is   normal.   Trace tricuspid valve regurgitation is present.   Normal appearing left atrium.   The left ventricle is normal in size, wall thickness, wall motion and   contractility.   Estimated left ventricular ejection fraction is 65-70 %.   Normal appearing right atrium.    CATH 7/19/24  Coronary Angiography   Left Coronary System:   A catheter was positioned into the vessel ostia under fluoroscopic  guidance. Angiograms were obtained in multiple views.  Right Coronary System:   A catheter was positioned into the vessel ostia under fluoroscopic  guidance. Angiograms were obtained in multiple views.    Diagnostic Findings:     Coronary Angiography   The coronary circulation is right dominant.      LM   Left main artery: Angiography shows no disease.      LAD   Mid left anterior descending: There is an 80 % stenosis. Distal left  anterior descending: There is a 100 % stenosis.    CX   Second obtuse marginal: There is an 80 % stenosis. FFR was performed  with a calculated value of 0.65. Based on the results,  the lesion was judged to be significant and an intervention was  performed.    RCA   Right coronary artery: Angiography shows mild atherosclerosis.      Left Heart Cath   [facility] performed: Aortic valve crossed and left ventricular pressures  were obtained.    Conclusions:   Successful PCI with balloon angioplsaty of mid LAD restenosis and ALEX  to OM2 (FFR 0.65).  Apical LAD is occluded.    Patent stents in LCX and RCA.  DAPT indefinitely

## 2024-11-18 NOTE — ED PROVIDER NOTE - CLINICAL SUMMARY MEDICAL DECISION MAKING FREE TEXT BOX
Due to patient's previous extensive cardiac history and 14 stents presenting symptom of exertional chest pain will do ACS workup and consult cardiology. Pt is currently not experiencing chest pain in ED, HD stable, afebrile and in no acute distress. EKG NSR 68, no acute ischemic changes. Dispo: admission for possible cath. Kamara: Due to patient's previous extensive cardiac history and 14 stents  and presenting symptom of exertional chest pain will do ACS workup and consult cardiology. Pt is currently not experiencing chest pain in ED, HD stable, afebrile and in no acute distress. EKG NSR 68, no acute ischemic changes. Dispo: admission for possible cath.

## 2024-11-18 NOTE — ED ADULT TRIAGE NOTE - SOURCE OF INFORMATION
SNF PROGRESS NOTE      Cc- UTI/ weakness      Patient is a Tata Zhao 81 y.o. female who is being seen at [facility] for UTI and confusion secondary to UTI. Patient is sitting in the room and eating breakfast. She has improved mentation. She denies any complaints at this time. Past Medical History:   Diagnosis Date    COPD (chronic obstructive pulmonary disease) (HCC)     Dementia (HCC)     Hypertension     Macular degeneration     Osteoarthritis     Sleep apnea      Sulfa antibiotics    VS reviewed    Gen- Alert and oriented x 1   Heart- RRR no murmur no LE edema   Lungs- CTA b/l no resp distress RA oxygen   Abd- bs x 4          Assessment and Plan    VRE UTI   Macrobid bid x 7 stop date 10/3  COVID   S/p decadron    S/p isolation   Asymptomatic.    Weakness  PT OT   Dementia   HTN  Metoprolol   COPD  CARMITA   Wears Cpap         Mae Desouza DO [facility]     Electronically signed by: Tab Mckeon DO on 9/30/2023 Patient

## 2024-11-18 NOTE — ED ADULT NURSE NOTE - OBJECTIVE STATEMENT
65 y/o male w/ pmh 14 stents, CAD, hypertension, hyperlipidemia presents to ED for chest pain and SOB x 1 week.  Patient states he started experiencing non radiating chest pain while walking his dog this morning, and walking out of the [employer] game to his car yesterday. Pt denies chest pain at this time.  Pt denies numbness/tingling, blurred vision, cough, fevers/chills, n/v/d.

## 2024-11-18 NOTE — DISCHARGE NOTE PROVIDER - HOSPITAL COURSE
64 year old male underwent a [facility] s/p ALEX x1 to mid LAD, POBA to distal LAD via RFA on 11/18 (view catheterization report for full details). Patient remained hemodynamically stable, neurovascularly intact and chest pain free. He remains overnight for observation and pain control. He was provided education regarding DAPT/statin therapy, as well as post procedure wound care instructions.  He will follow up with his private cardiologist in 2 weeks and advised to return to ER with any concerning symptoms.     64 year old male with PMHx of CAD s/p 14 stents and brachytherapy, HLD, GERD, anxiety, depression, EVY, who presented to ED with worsening chest pain consistent with stable angina, but not response to current antianginal medications. He underwent a [facility] s/p ALEX x1 to mid LAD, POBA to distal LAD via RFA on 11/18 (view catheterization report for full details). Patient remained hemodynamically stable, neurovascularly intact and chest pain free. He remains overnight for observation and pain control. He was provided education regarding DAPT/statin therapy, as well as post procedure wound care instructions.  He will follow up with his private cardiologist in 2 weeks and advised to return to ER with any concerning symptoms.  He had no overnight events TTE pending discharge.     Cardiac Rehab (Post PCI):              *Education on benefits of Cardiac Rehab provided to patient: Yes         *Referral and Prescription Given for Cardiac Rehab : Yes         *Pt given list of locations & instructed to contact their insurance company to review list of participating providers         *Pt instructed to bring Cardiac Rehab prescription with them to Cardiology Follow up appointment for assistance with enrollment: Yes         *Pt discharged with copies detail cardiovascular history, medications, testing/treatments           64 year old male with PMHx of CAD s/p 14 stents and brachytherapy, HLD, GERD, anxiety, depression, EVY, who presented to ED with worsening chest pain consistent with stable angina, but not response to current antianginal medications. He underwent a [facility] s/p ALEX x1 to mid LAD, POBA to distal LAD via RFA on 11/18 (view catheterization report for full details). Patient remained hemodynamically stable, neurovascularly intact and chest pain free. He remains overnight for observation and pain control. He was provided education regarding DAPT/statin therapy, as well as post procedure wound care instructions.  He will follow up with his private cardiologist in 2 weeks and advised to return to ER with any concerning symptoms.  No tele or site events.  Pt stable for discharge home and will outpt TTE as discussed with Dr. Camara.     Cardiac Rehab (Post PCI):              *Education on benefits of Cardiac Rehab provided to patient: Yes         *Referral and Prescription Given for Cardiac Rehab : Yes         *Pt given list of locations & instructed to contact their insurance company to review list of participating providers         *Pt instructed to bring Cardiac Rehab prescription with them to Cardiology Follow up appointment for assistance with enrollment: Yes         *Pt discharged with copies detail cardiovascular history, medications, testing/treatments

## 2024-11-19 ENCOUNTER — TRANSCRIPTION ENCOUNTER (OUTPATIENT)
Age: 64
End: 2024-11-19

## 2024-11-19 VITALS
TEMPERATURE: 98 F | OXYGEN SATURATION: 96 % | HEART RATE: 71 BPM | SYSTOLIC BLOOD PRESSURE: 127 MMHG | DIASTOLIC BLOOD PRESSURE: 70 MMHG | RESPIRATION RATE: 17 BRPM

## 2024-11-19 PROCEDURE — 71046 X-RAY EXAM CHEST 2 VIEWS: CPT

## 2024-11-19 PROCEDURE — C1874: CPT

## 2024-11-19 PROCEDURE — 93454 CORONARY ARTERY ANGIO S&I: CPT | Mod: 59

## 2024-11-19 PROCEDURE — 99232 SBSQ HOSP IP/OBS MODERATE 35: CPT

## 2024-11-19 PROCEDURE — C1725: CPT

## 2024-11-19 PROCEDURE — 84484 ASSAY OF TROPONIN QUANT: CPT

## 2024-11-19 PROCEDURE — 85025 COMPLETE CBC W/AUTO DIFF WBC: CPT

## 2024-11-19 PROCEDURE — 80053 COMPREHEN METABOLIC PANEL: CPT

## 2024-11-19 PROCEDURE — 83880 ASSAY OF NATRIURETIC PEPTIDE: CPT

## 2024-11-19 PROCEDURE — 82803 BLOOD GASES ANY COMBINATION: CPT

## 2024-11-19 PROCEDURE — C1769: CPT

## 2024-11-19 PROCEDURE — C1887: CPT

## 2024-11-19 PROCEDURE — C1894: CPT

## 2024-11-19 PROCEDURE — 99285 EMERGENCY DEPT VISIT HI MDM: CPT

## 2024-11-19 PROCEDURE — 93005 ELECTROCARDIOGRAM TRACING: CPT

## 2024-11-19 PROCEDURE — C9600: CPT | Mod: LD

## 2024-11-19 RX ADMIN — Medication 81 MILLIGRAM(S): at 04:45

## 2024-11-19 RX ADMIN — CLOPIDOGREL 75 MILLIGRAM(S): 75 TABLET, FILM COATED ORAL at 04:45

## 2024-11-19 NOTE — DISCHARGE NOTE NURSING/CASE MANAGEMENT/SOCIAL WORK - NSDCVIVACCINE_GEN_ALL_CORE_FT
Tdap; 14-Apr-2024 15:53; Elissa Potter (RN); [employer]; [id number] (Exp. Date: 06-Dec-2024); IntraMuscular; Deltoid Left.; 0.5 milliLiter(s); VIS (VIS Published: 09-May-2013, VIS Presented: 14-Apr-2024);

## 2024-11-19 NOTE — DISCHARGE NOTE NURSING/CASE MANAGEMENT/SOCIAL WORK - FINANCIAL ASSISTANCE
[facility] provides services at a reduced cost to those who are determined to be eligible through [facility]’s financial assistance program. Information regarding [facility]’s financial assistance program can be found by going to [email or web address] or by calling [phone].

## 2024-11-19 NOTE — PROGRESS NOTE ADULT - SUBJECTIVE AND OBJECTIVE BOX
[facility] INVASIVE CARDIOLOGY -- Olive Carlson, Hoa, Mateo, Oliva, Miguel Angel, Anurag, Alfredo, Daya Tse-ARMOND Novoa  Cardiac cath/EP lab - [facility] Team  [phone]     Chief complaint: Patient is a 64y old  Male who presents with a chief complaint of     HPI:  64 year old male with PMHx of CAD s/p 14 stents and brachytherapy, HLD, GERD, anxiety, depression, EVY, who is presenting to the ED today with worsening chest pain consistent with stable angina, but not response to current antianginal medications. Two weeks ago while walking he noticed increasing SOB which was new from prior and decreased exercise tolerance. This past week noticed L sided chest pressure 8/10 that radiated to his L arm while walking 1/4 mile. Relieved after 4-5 mins of rest. Felt similar to prior episodes of chest pain. Patient now here for [facility] w/ Dr. Camara today to assess patency of stents. Denies chest pain, palpitations, SOB, dizziness.    Cards: Demetrius Chavis (18 Nov 2024 17:35)      Subjective/Observations: Patient seen and assessed at bedside. Denies changes in vision, headaches, dizziness, chest pain, palpitations, SOB, abdominal pain, N/V/D, leg pain/edema or any other complaints.       ROS Negative except as per Subjective and HPI      PAST MEDICAL & SURGICAL HISTORY:  CAD (coronary artery disease)      HTN (hypertension)      H/O: depression      STEMI (ST elevation myocardial infarction)  at 49 y/o      HLD (hyperlipidemia)      GERD (gastroesophageal reflux disease)      History of coronary artery stent placement      History of brachytherapy          ALLERGIES:  No Known Allergies      MEDICATIONS:  metoprolol succinate ER 50 milliGRAM(s) Oral daily    lamoTRIgine 200 milliGRAM(s) Oral daily  venlafaxine XR. 150 milliGRAM(s) Oral daily    atorvastatin 80 milliGRAM(s) Oral at bedtime    aspirin enteric coated 81 milliGRAM(s) Oral daily  clopidogrel Tablet 75 milliGRAM(s) Oral daily  sodium chloride 0.9%. 1000 milliLiter(s) IV Continuous <Continuous>      TELEMETRY:  T(C): 36.5 (11-18-24 @ 23:10), Max: 37 (11-18-24 @ 10:17)  HR: 83 (11-18-24 @ 23:10) (65 - 83)  BP: 135/72 (11-18-24 @ 23:10) (105/65 - 149/69)  RR: 16 (11-18-24 @ 23:10) (16 - 20)  SpO2: 96% (11-18-24 @ 23:10) (92% - 100%)  Wt(kg): --  I&O's Summary    Height (cm): 190.5 (11-18 @ 10:17)  Weight (kg): 113.4 (11-18 @ 10:17)  BMI (kg/m2): 31.2 (11-18 @ 10:17)  BSA (m2): 2.41 (11-18 @ 10:17)  Dick[place]:  Central/PICC/Mid Line:                                         FOCUSED PHYSICAL EXAM:  Appearance: Normal  Cardiovascular: Normal S1 S2, No JVD, No murmurs, rubs, gallops or clicks  Respiratory: Lungs clear to auscultation. No Rales, Rhonchi, Wheezing	  Vascular: groin site stable w/o bleeding or hematoma, soft, + pulses     ECG:  	  RADIOLOGY:   DIAGNOSTIC TESTING:  [ ] Echocardiogram:  < from: TTE Echo Complete w/o Contrast w/ Doppler (09.02.23 @ 07:42) >   Summary     The mitral valve leaflets appear thin and normal.   Trace mitral regurgitation is present.   The aortic valve is trileaflet with thin pliable leaflets.   The tricuspid valve leaflets are thin and pliable; valve motion is   normal.   Trace tricuspid valve regurgitation is present.   Normal appearing left atrium.   The left ventricle is normal in size, wall thickness, wall motion and   contractility.   Estimated left ventricular ejection fraction is 65-70 %.   Normal appearing right atrium.   Normal appearing right ventricle structure and function.    < end of copied text >    [ ] Catheterization:  [ ] Stress Test:    [ ] ANNA  [ ] CCTA    OTHER: 	  < from: Cardiac Catheterization (07.19.24 @ 14:14) >  Procedures Performed   Procedures:            1.    Arterial Access - Right Femoral   2.    Diagnostic Coronary Angiography   3.    Ultrasound Guided Access   4.    FFR   5.    PCI: POBA   6.    PCI: ALEX     Indications:               ACS greater than 24 hours   PCI Status:    elective     Conclusions:   Successful PCI with balloon angioplsaty of mid LAD restenosis and ALEX  to OM2 (FFR 0.65).  Apical LAD is occluded.    Patent stents in LCX and RCA.  DAPT indefinitely     Diagnostic Findings:     Coronary Angiography   The coronary circulation is right dominant.      LM   Left main artery: Angiography shows no disease.      LAD   Mid left anterior descending: There is an 80 % stenosis. Distal left  anterior descending: There is a 100 % stenosis.    CX   Second obtuse marginal: There is an 80 % stenosis. FFR was performed  with a calculated value of 0.65. Based on the results,  the lesion was judged to be significant and an intervention was  performed.    RCA   Right coronary artery: Angiography shows mild atherosclerosis.      Left Heart Cath   LHC performed: Aortic valve crossed and left ventricular pressures  were obtained.    < end of copied text >      LABS: All labs reviewed	 	  CARDIAC MARKERS:                  15.7   6.70  )-----------( 206      ( 18 Nov 2024 12:00 )             46.4     11-18    143  |  107  |  20  ----------------------------<  105[H]  4.1   |  24  |  0.78    Ca    9.2      18 Nov 2024 12:00    TPro  7.2  /  Alb  4.1  /  TBili  0.6  /  DBili  x   /  AST  16  /  ALT  25  /  AlkPhos  93  11-18    proBNP:   Lipid Profile:   HgA1c:   TSH:

## 2024-11-19 NOTE — DISCHARGE NOTE NURSING/CASE MANAGEMENT/SOCIAL WORK - NSDCPEFALRISK_GEN_ALL_CORE
For information on Fall & Injury Prevention, visit: [email or web address] OR  [email or web address] OR  https://www.cdc.gov/steadi/patient.html

## 2024-11-19 NOTE — DISCHARGE NOTE NURSING/CASE MANAGEMENT/SOCIAL WORK - PATIENT PORTAL LINK FT
You can access the FollowMyHealth Patient Portal offered by [facility] by registering at the following website: [email or web address]. By joining [employer]’s FollowMyHealth portal, you will also be able to view your health information using other applications (apps) compatible with our system.

## 2024-11-19 NOTE — PROGRESS NOTE ADULT - ASSESSMENT
64M Hx HTN, HLD, CAD s/p PCI x14 and brachytherapy, GERD, depression/anxiety presented to hospital c/o exertional chest pain/SOB; admitted for management of stable angina. Now s/p ALEX x1 mLAD and POBA dLAD 11/18    # CAD  - Presented to hospital c/o exertional chest pain and SOB/HENDRIX x2 weeks  - Now s/p C 11/18: ALEX x1 mLAD and POBA dLAD via RFA  - Right groin site stable; soft, nontender, no hematoma  - Continue Aspirin 81mg qd, Plavix 75mg qd, Atorvastatin 80mg qhs  - TTE pending    # HTN  - Normotensive  - Continue Toprol 50mg qs    # HLD  - Continue Atorvastatin 80mg qhs    # PPx  - DVT ppx: none  - Diet: DASH/TLC  - Dispo: pending TTE    Nano Singer PA-C  Invasive cardiology  [phone]

## 2024-12-07 ENCOUNTER — INPATIENT (INPATIENT)
Facility: HOSPITAL | Age: 64
LOS: 1 days | Discharge: ANOTHER HOSPITAL | DRG: 311 | End: 2024-12-09
Attending: HOSPITALIST | Admitting: STUDENT IN AN ORGANIZED HEALTH CARE EDUCATION/TRAINING PROGRAM
Payer: COMMERCIAL

## 2024-12-07 VITALS
OXYGEN SATURATION: 99 % | WEIGHT: 250 LBS | HEART RATE: 101 BPM | TEMPERATURE: 98 F | RESPIRATION RATE: 18 BRPM | SYSTOLIC BLOOD PRESSURE: 185 MMHG | DIASTOLIC BLOOD PRESSURE: 75 MMHG | HEIGHT: 75 IN

## 2024-12-07 DIAGNOSIS — E78.00 PURE HYPERCHOLESTEROLEMIA, UNSPECIFIED: ICD-10-CM

## 2024-12-07 DIAGNOSIS — K21.9 GASTRO-ESOPHAGEAL REFLUX DISEASE WITHOUT ESOPHAGITIS: ICD-10-CM

## 2024-12-07 DIAGNOSIS — Z29.9 ENCOUNTER FOR PROPHYLACTIC MEASURES, UNSPECIFIED: ICD-10-CM

## 2024-12-07 DIAGNOSIS — I25.2 OLD MYOCARDIAL INFARCTION: ICD-10-CM

## 2024-12-07 DIAGNOSIS — I20.0 UNSTABLE ANGINA: ICD-10-CM

## 2024-12-07 DIAGNOSIS — F41.9 ANXIETY DISORDER, UNSPECIFIED: ICD-10-CM

## 2024-12-07 DIAGNOSIS — F32.A DEPRESSION, UNSPECIFIED: ICD-10-CM

## 2024-12-07 DIAGNOSIS — Z79.02 LONG TERM (CURRENT) USE OF ANTITHROMBOTICS/ANTIPLATELETS: ICD-10-CM

## 2024-12-07 DIAGNOSIS — I25.10 ATHEROSCLEROTIC HEART DISEASE OF NATIVE CORONARY ARTERY WITHOUT ANGINA PECTORIS: ICD-10-CM

## 2024-12-07 DIAGNOSIS — Z79.82 LONG TERM (CURRENT) USE OF ASPIRIN: ICD-10-CM

## 2024-12-07 DIAGNOSIS — I08.1 RHEUMATIC DISORDERS OF BOTH MITRAL AND TRICUSPID VALVES: ICD-10-CM

## 2024-12-07 DIAGNOSIS — I25.110 ATHEROSCLEROTIC HEART DISEASE OF NATIVE CORONARY ARTERY WITH UNSTABLE ANGINA PECTORIS: ICD-10-CM

## 2024-12-07 DIAGNOSIS — I24.9 ACUTE ISCHEMIC HEART DISEASE, UNSPECIFIED: ICD-10-CM

## 2024-12-07 DIAGNOSIS — Z95.5 PRESENCE OF CORONARY ANGIOPLASTY IMPLANT AND GRAFT: Chronic | ICD-10-CM

## 2024-12-07 DIAGNOSIS — I10 ESSENTIAL (PRIMARY) HYPERTENSION: ICD-10-CM

## 2024-12-07 DIAGNOSIS — Z95.5 PRESENCE OF CORONARY ANGIOPLASTY IMPLANT AND GRAFT: ICD-10-CM

## 2024-12-07 DIAGNOSIS — Z92.3 PERSONAL HISTORY OF IRRADIATION: Chronic | ICD-10-CM

## 2024-12-07 LAB
ALBUMIN SERPL ELPH-MCNC: 3.6 G/DL — SIGNIFICANT CHANGE UP (ref 3.3–5)
ALP SERPL-CCNC: 107 U/L — SIGNIFICANT CHANGE UP (ref 40–120)
ALT FLD-CCNC: 39 U/L — SIGNIFICANT CHANGE UP (ref 12–78)
ANION GAP SERPL CALC-SCNC: 2 MMOL/L — LOW (ref 5–17)
AST SERPL-CCNC: 19 U/L — SIGNIFICANT CHANGE UP (ref 15–37)
BASOPHILS # BLD AUTO: 0.04 K/UL — SIGNIFICANT CHANGE UP (ref 0–0.2)
BASOPHILS NFR BLD AUTO: 0.5 % — SIGNIFICANT CHANGE UP (ref 0–2)
BILIRUB SERPL-MCNC: 0.4 MG/DL — SIGNIFICANT CHANGE UP (ref 0.2–1.2)
BUN SERPL-MCNC: 13 MG/DL — SIGNIFICANT CHANGE UP (ref 7–23)
CALCIUM SERPL-MCNC: 9.2 MG/DL — SIGNIFICANT CHANGE UP (ref 8.5–10.1)
CHLORIDE SERPL-SCNC: 111 MMOL/L — HIGH (ref 96–108)
CO2 SERPL-SCNC: 29 MMOL/L — SIGNIFICANT CHANGE UP (ref 22–31)
CREAT SERPL-MCNC: 0.85 MG/DL — SIGNIFICANT CHANGE UP (ref 0.5–1.3)
D DIMER BLD IA.RAPID-MCNC: <150 NG/ML DDU — SIGNIFICANT CHANGE UP
EGFR: 98 ML/MIN/1.73M2 — SIGNIFICANT CHANGE UP
EOSINOPHIL # BLD AUTO: 0.1 K/UL — SIGNIFICANT CHANGE UP (ref 0–0.5)
EOSINOPHIL NFR BLD AUTO: 1.3 % — SIGNIFICANT CHANGE UP (ref 0–6)
GLUCOSE SERPL-MCNC: 81 MG/DL — SIGNIFICANT CHANGE UP (ref 70–99)
HCT VFR BLD CALC: 45.8 % — SIGNIFICANT CHANGE UP (ref 39–50)
HGB BLD-MCNC: 15.6 G/DL — SIGNIFICANT CHANGE UP (ref 13–17)
IMM GRANULOCYTES NFR BLD AUTO: 0.4 % — SIGNIFICANT CHANGE UP (ref 0–0.9)
INR BLD: 0.98 RATIO — SIGNIFICANT CHANGE UP (ref 0.85–1.16)
LYMPHOCYTES # BLD AUTO: 1.53 K/UL — SIGNIFICANT CHANGE UP (ref 1–3.3)
LYMPHOCYTES # BLD AUTO: 19.1 % — SIGNIFICANT CHANGE UP (ref 13–44)
MAGNESIUM SERPL-MCNC: 2.2 MG/DL — SIGNIFICANT CHANGE UP (ref 1.6–2.6)
MCHC RBC-ENTMCNC: 31.8 PG — SIGNIFICANT CHANGE UP (ref 27–34)
MCHC RBC-ENTMCNC: 34.1 G/DL — SIGNIFICANT CHANGE UP (ref 32–36)
MCV RBC AUTO: 93.3 FL — SIGNIFICANT CHANGE UP (ref 80–100)
MONOCYTES # BLD AUTO: 0.59 K/UL — SIGNIFICANT CHANGE UP (ref 0–0.9)
MONOCYTES NFR BLD AUTO: 7.4 % — SIGNIFICANT CHANGE UP (ref 2–14)
NEUTROPHILS # BLD AUTO: 5.7 K/UL — SIGNIFICANT CHANGE UP (ref 1.8–7.4)
NEUTROPHILS NFR BLD AUTO: 71.3 % — SIGNIFICANT CHANGE UP (ref 43–77)
NT-PROBNP SERPL-SCNC: 37 PG/ML — SIGNIFICANT CHANGE UP (ref 0–125)
PLATELET # BLD AUTO: 195 K/UL — SIGNIFICANT CHANGE UP (ref 150–400)
POTASSIUM SERPL-MCNC: 3.7 MMOL/L — SIGNIFICANT CHANGE UP (ref 3.5–5.3)
POTASSIUM SERPL-SCNC: 3.7 MMOL/L — SIGNIFICANT CHANGE UP (ref 3.5–5.3)
PROT SERPL-MCNC: 7.3 GM/DL — SIGNIFICANT CHANGE UP (ref 6–8.3)
PROTHROM AB SERPL-ACNC: 11.3 SEC — SIGNIFICANT CHANGE UP (ref 9.9–13.4)
RBC # BLD: 4.91 M/UL — SIGNIFICANT CHANGE UP (ref 4.2–5.8)
RBC # FLD: 12.6 % — SIGNIFICANT CHANGE UP (ref 10.3–14.5)
SODIUM SERPL-SCNC: 142 MMOL/L — SIGNIFICANT CHANGE UP (ref 135–145)
TROPONIN I, HIGH SENSITIVITY RESULT: 6.1 NG/L — SIGNIFICANT CHANGE UP
WBC # BLD: 7.99 K/UL — SIGNIFICANT CHANGE UP (ref 3.8–10.5)
WBC # FLD AUTO: 7.99 K/UL — SIGNIFICANT CHANGE UP (ref 3.8–10.5)

## 2024-12-07 PROCEDURE — 99285 EMERGENCY DEPT VISIT HI MDM: CPT

## 2024-12-07 PROCEDURE — 82550 ASSAY OF CK (CPK): CPT

## 2024-12-07 PROCEDURE — 71046 X-RAY EXAM CHEST 2 VIEWS: CPT | Mod: 26

## 2024-12-07 PROCEDURE — 87640 STAPH A DNA AMP PROBE: CPT

## 2024-12-07 PROCEDURE — 85027 COMPLETE CBC AUTOMATED: CPT

## 2024-12-07 PROCEDURE — 84484 ASSAY OF TROPONIN QUANT: CPT

## 2024-12-07 PROCEDURE — 83735 ASSAY OF MAGNESIUM: CPT

## 2024-12-07 PROCEDURE — 87641 MR-STAPH DNA AMP PROBE: CPT

## 2024-12-07 PROCEDURE — 93306 TTE W/DOPPLER COMPLETE: CPT

## 2024-12-07 PROCEDURE — 85730 THROMBOPLASTIN TIME PARTIAL: CPT

## 2024-12-07 PROCEDURE — 85025 COMPLETE CBC W/AUTO DIFF WBC: CPT

## 2024-12-07 PROCEDURE — 86803 HEPATITIS C AB TEST: CPT

## 2024-12-07 PROCEDURE — 84100 ASSAY OF PHOSPHORUS: CPT

## 2024-12-07 PROCEDURE — 93010 ELECTROCARDIOGRAM REPORT: CPT | Mod: 76

## 2024-12-07 PROCEDURE — 80053 COMPREHEN METABOLIC PANEL: CPT

## 2024-12-07 PROCEDURE — 80061 LIPID PANEL: CPT

## 2024-12-07 PROCEDURE — 84443 ASSAY THYROID STIM HORMONE: CPT

## 2024-12-07 PROCEDURE — 80048 BASIC METABOLIC PNL TOTAL CA: CPT

## 2024-12-07 PROCEDURE — 93005 ELECTROCARDIOGRAM TRACING: CPT

## 2024-12-07 PROCEDURE — 36415 COLL VENOUS BLD VENIPUNCTURE: CPT

## 2024-12-07 RX ORDER — HEPARIN SODIUM,PORCINE 1000/ML
6000 VIAL (ML) INJECTION EVERY 6 HOURS
Refills: 0 | Status: DISCONTINUED | OUTPATIENT
Start: 2024-12-07 | End: 2024-12-09

## 2024-12-07 RX ORDER — HEPARIN SODIUM,PORCINE 1000/ML
VIAL (ML) INJECTION
Qty: 25000 | Refills: 0 | Status: DISCONTINUED | OUTPATIENT
Start: 2024-12-07 | End: 2024-12-09

## 2024-12-07 RX ORDER — METOPROLOL TARTRATE 100 MG/1
50 TABLET, FILM COATED ORAL AT BEDTIME
Refills: 0 | Status: DISCONTINUED | OUTPATIENT
Start: 2024-12-07 | End: 2024-12-09

## 2024-12-07 RX ORDER — ACETAMINOPHEN, DIPHENHYDRAMINE HCL, PHENYLEPHRINE HCL 325; 25; 5 MG/1; MG/1; MG/1
3 TABLET ORAL AT BEDTIME
Refills: 0 | Status: DISCONTINUED | OUTPATIENT
Start: 2024-12-07 | End: 2024-12-09

## 2024-12-07 RX ORDER — CLOPIDOGREL 75 MG/1
75 TABLET, FILM COATED ORAL DAILY
Refills: 0 | Status: DISCONTINUED | OUTPATIENT
Start: 2024-12-07 | End: 2024-12-09

## 2024-12-07 RX ORDER — NITROGLYCERIN 0.4MG/HR
0.4 PATCH, TRANSDERMAL 24 HOURS TRANSDERMAL
Refills: 0 | Status: DISCONTINUED | OUTPATIENT
Start: 2024-12-07 | End: 2024-12-09

## 2024-12-07 RX ORDER — ALPRAZOLAM 0.5 MG
1 TABLET ORAL THREE TIMES A DAY
Refills: 0 | Status: DISCONTINUED | OUTPATIENT
Start: 2024-12-07 | End: 2024-12-09

## 2024-12-07 RX ORDER — MAGNESIUM, ALUMINUM HYDROXIDE 200-225/5
30 SUSPENSION, ORAL (FINAL DOSE FORM) ORAL EVERY 4 HOURS
Refills: 0 | Status: DISCONTINUED | OUTPATIENT
Start: 2024-12-07 | End: 2024-12-09

## 2024-12-07 RX ORDER — ACETAMINOPHEN 500MG 500 MG/1
650 TABLET, COATED ORAL EVERY 6 HOURS
Refills: 0 | Status: DISCONTINUED | OUTPATIENT
Start: 2024-12-07 | End: 2024-12-09

## 2024-12-07 RX ORDER — ONDANSETRON HYDROCHLORIDE 4 MG/1
4 TABLET, FILM COATED ORAL EVERY 8 HOURS
Refills: 0 | Status: DISCONTINUED | OUTPATIENT
Start: 2024-12-07 | End: 2024-12-09

## 2024-12-07 RX ORDER — VENLAFAXINE HYDROCHLORIDE 75 MG/1
150 CAPSULE, EXTENDED RELEASE ORAL DAILY
Refills: 0 | Status: DISCONTINUED | OUTPATIENT
Start: 2024-12-07 | End: 2024-12-09

## 2024-12-07 RX ORDER — LAMOTRIGINE 50 MG/1
100 TABLET, EXTENDED RELEASE ORAL
Refills: 0 | Status: DISCONTINUED | OUTPATIENT
Start: 2024-12-07 | End: 2024-12-08

## 2024-12-07 RX ORDER — PANTOPRAZOLE SODIUM 40 MG/1
40 TABLET, DELAYED RELEASE ORAL
Refills: 0 | Status: DISCONTINUED | OUTPATIENT
Start: 2024-12-07 | End: 2024-12-09

## 2024-12-07 RX ORDER — HEPARIN SODIUM,PORCINE 1000/ML
5000 VIAL (ML) INJECTION ONCE
Refills: 0 | Status: COMPLETED | OUTPATIENT
Start: 2024-12-07 | End: 2024-12-07

## 2024-12-07 RX ADMIN — Medication 1000 UNIT(S)/HR: at 22:18

## 2024-12-07 RX ADMIN — Medication 5000 UNIT(S): at 22:21

## 2024-12-07 NOTE — ED ADULT TRIAGE NOTE - CHIEF COMPLAINT QUOTE
Pt BIBEMS from home complaining of chest pain x 1 day. Pt was running errands when midsternal chest pain began then resolved. Another flair up of symptoms occurred while walking down stairs at home. 18 g IV inserted by EMS. ASA given. Pt taken for stat EKG.

## 2024-12-07 NOTE — ED ADULT NURSE NOTE - OBJECTIVE STATEMENT
Pt presents to ED c/o multiple episodes of CP, SOB on exertion. Pt's cardiac hx includes MI, 13 stents, HTN, HLD, and CAD. Pt took full dose ASA PTA. Pt states all symtpoms have resolved at this time. Pt able to ambulate to restroom without CP and SOB; states it is triggered with more intensive activity. AxOx4, steady gait, in NAD at this time.

## 2024-12-07 NOTE — H&P ADULT - PROBLEM SELECTOR PLAN 1
Troponins 6, BNP 37  ECG: ST depression in inferior leads.   ECHO in am  C/w hep gtt  C/w aspirin, statin, BB, sublingual nitro  PPI for GI prophylaxis.   F/u TSH, lipid panel.  Cardio Consult placed.

## 2024-12-07 NOTE — ED PROVIDER NOTE - PROGRESS NOTE DETAILS
Consulted Dr Valadez covering fro pt's cardiologist Dr Chavis. Recommends to start heparin and pt to be admitted. Informed pt of the plan and agreeable for admission. Pending labs. -Eduardo Anderson PA-C Labs including DD and trop unremarkable. GIven abnormal EKG and pt symptomatic, will admit for further evaluation. -Eduardo Anderson PA-C Consulted Dr Valadez covering fro pt's cardiologist Dr Chavis. Recommends to start heparin and pt to be admitted and will involve interventional tomorrow. Informed pt of the plan and agreeable for admission. Pending labs. -dEuardo Anderson PA-C

## 2024-12-07 NOTE — ED PROVIDER NOTE - ATTENDING APP SHARED VISIT CONTRIBUTION OF CARE
TEODORA Weiss MD, ED Attending physician:  This was a shared visit with STEPHEN.  I reviewed and verified the documentation and independently performed the documented history/exam/mdm.

## 2024-12-07 NOTE — H&P ADULT - NSHPLABSRESULTS_GEN_ALL_CORE
LABS:  cret                        15.6   7.99  )-----------( 195      ( 07 Dec 2024 20:40 )             45.8     12-07    142  |  111[H]  |  13  ----------------------------<  81  3.7   |  29  |  0.85    Ca    9.2      07 Dec 2024 20:40  Mg     2.2     12-07    TPro  7.3  /  Alb  3.6  /  TBili  0.4  /  DBili  x   /  AST  19  /  ALT  39  /  AlkPhos  107  12-07    PT/INR - ( 07 Dec 2024 20:40 )   PT: 11.3 sec;   INR: 0.98 ratio

## 2024-12-07 NOTE — ED PROVIDER NOTE - PHYSICAL EXAMINATION
ANNA Weiss MD, ED Attdg:  Gen'l: Older M adult in NAD, no respiratory discomfort, no sentence shortening, not acutely ill.  Head: NC/AT  Eyes: PERRL, EOMI  ENT: O/P clear, mmm  CV: RRR, normal radial pulse  Lungs: CTA, normal respirations  GI: soft, NT, BS+  : Deferred, no flank nor CVAT  Neck: NT, supple w/o pain  MSK: HERNANDEZ x 4, no focal extremity swelling nor tenderness  Skin: no tactile warmth, no rash  Neuro: A+O x 4, CN 2 -12 intact, normal speech, no focal motor/sensory deficits

## 2024-12-07 NOTE — ED PROVIDER NOTE - CLINICAL SUMMARY MEDICAL DECISION MAKING FREE TEXT BOX
65 y/o male with PMH: CAD, 13 stents, on Plavix and aspirin, GERD, high cholesterol, STEMI at age 48, HTN, currently on ASA & Plavix; BIBA from home c/o'ing exertional mid-chest pressure discomfort & HENDRIX episodes since this afternoon.  Pt's most recent cardiac acth 11/18/24 w/ + stent placed to mid LAD.  Pt took  at home PTA, took his Plavix this AM.  Pt currently pain-free at rest in ED.  Plan: EKG, CXR, cardiac labs, d/w Cardiology.  Paging Pt's Cardio MD, Dr. Chavis. 63 y/o male with PMH: CAD, 13 stents, on Plavix and aspirin, GERD, high cholesterol, STEMI at age 48, HTN, currently on ASA & Plavix; BIBA from home c/o'ing exertional mid-chest pressure discomfort & HENDRIX episodes since this afternoon.  Pt's most recent cardiac acth 11/18/24 w/ + stent placed to mid LAD.  Pt took  at home PTA, took his Plavix this AM.  Pt currently pain-free at rest in ED.  Plan: EKG, CXR, cardiac labs, d/w Cardiology.  Paging Pt's Cardio MD, Dr. Chavis.    20:30, ANNA Weiss MD:  EKG wet read personally by me entered; apparent new 1 mm ST depression inf lds.  Pt remains asympt. in ED.  Labs results still pending. Case d/w Dr. Valadez (covering for Partha): advises cardiac heparin, Tele admit, will officially consult in AM.  ED RN obtaining actual weight, then will order heparin. 65 y/o male with PMH: CAD, 13 stents, on Plavix and aspirin, GERD, high cholesterol, STEMI at age 48, HTN, currently on ASA & Plavix; BIBA from home c/o'ing exertional mid-chest pressure discomfort & HENDRIX episodes since this afternoon.  Pt's most recent cardiac cath 11/18/24 w/ + stent placed to mid LAD.  Pt took  at home PTA, took his Plavix this AM.  Pt currently pain-free at rest in ED.  Plan: EKG, CXR, cardiac labs, d/w Cardiology.  Paging Pt's Cardio MD, Dr. Chavis.    20:30, ANNA Weiss MD:  EKG wet read personally by me entered; apparent new 1 mm ST depression inf lds.  Pt remains asympt. in ED.  Labs results still pending. Case d/w Dr. Valadez (covering for Partha): advises cardiac heparin, Tele admit, will officially consult in AM.  ED RN obtaining actual weight, then will order heparin.    See Progress Notes for case progression, including labs review, d/w admit hospitalist & admit disposition.

## 2024-12-07 NOTE — H&P ADULT - HISTORY OF PRESENT ILLNESS
64M w/ h/o STEMI at age 48, CAD s/p PCI with 13 stents (Last PCI 11/18/24, ALEX to mLAD), on Plavix and aspirin, GERD, high cholesterol, HTN, presents with complaints of exertional substernal chest pain associated with dyspnea for 1 day. Patient noticed transient chest pains today while climbing uphill which improved with rest, and also when climbing stairs. Has been compliant with his DAPT. No other acute complaints. Patient took 325 of aspirin prior to arrival.  Denies leg swelling, smoking or drug use. Denies other ROS. In ED vitals stable, chest pain free. CBC/CMP unremarkable. initial trop negative. ECG noted for new ST-depressions in inferior leads. Case discussed with cardiology by ED provider. Patient started on hep gtt and admitted to  for further care.

## 2024-12-07 NOTE — H&P ADULT - NSHPPHYSICALEXAM_GEN_ALL_CORE
T(C): 36.6 (12-07-24 @ 18:54), Max: 36.6 (12-07-24 @ 18:54)  HR: 101 (12-07-24 @ 18:54) (101 - 101)  BP: 185/75 (12-07-24 @ 18:54) (185/75 - 185/75)  RR: 18 (12-07-24 @ 18:54) (18 - 18)  SpO2: 99% (12-07-24 @ 18:54) (99% - 99%)    General: non-toxic  HEENT: non-traumatic, perrla, eomi  Cardio: s1s2 regular rate and rhythm  Lungs: comfortable breathing, clear to auscultation  Abdomen: Soft, non-tender, non-distended  Neuro: AOx4  Ext: Pulses +2

## 2024-12-07 NOTE — ED PROVIDER NOTE - OBJECTIVE STATEMENT
64-year-old male with a past medical history of CAD, 13 stents, on Plavix and aspirin, GERD, high cholesterol, STEMI at age 48, high blood pressure was BIBA for chest pain and shortness of breath.  Patient noticed the chest pain started when he was walking outside, better at rest.  Pain again returned as he was walking down stairs to do laundry.  Episodes of shortness of breath with exertion as well that started today.  Patient took 325 of aspirin prior to arrival.  Denies leg swelling, smoking or drug use.    Patient had recent stent placement by Dr. Camara from [place] approximately 3 weeks ago and has had a prior stent that was placed 4 months ago.  Cardiologist = Dr. Chavis 64-year-old male with a past medical history of CAD, 13 stents, on Plavix and aspirin, GERD, high cholesterol, STEMI at age 48, high blood pressure was BIBA for chest pain and shortness of breath.  Patient noticed the chest pain started when he was walking outside, better at rest.  Pain again returned as he was walking down stairs to do laundry.  Episodes of shortness of breath with exertion as well that started today.  Patient took 325 of aspirin prior to arrival.  Denies leg swelling, smoking or drug use.    Patient had recent stent placement by Dr. Camara from [place] approximately 3 weeks ago and has had a prior stent that was placed 4 months ago.  Cardiologist = Dr. Partha Weiss MD, ED Attdg:  Case d/w ED PA.  Pt seen/evaluated.   65 y/o male with PMH: CAD, 13 stents, on Plavix and aspirin, GERD, high cholesterol, STEMI at age 48, HTN, currently on ASA & Plavix; BIBA from home c/o'ing exertional mid-chest pressure discomfort & HENDRIX episodes since this afternoon.  3 - 4 episodes today, + resolved w/ rest, no cp nor SOB at trest, only upon exertion, walking outside up incline or up/down stairs at home.  Pt's most recent cardiac acth 11/18/24 w/ + stent placed to mid LAD.  Pt took  at home PTA, took his Plavix this AM.  Pt currently pain-free at rest in ED. 64-year-old male with a past medical history of CAD, 13 stents, on Plavix and aspirin, GERD, high cholesterol, STEMI at age 48, high blood pressure was BIBA for chest pain and shortness of breath.  Patient noticed the chest pain started when he was walking outside, better at rest.  Pain again returned as he was walking down stairs to do laundry.  Episodes of shortness of breath with exertion as well that started today.  Patient took 325 of aspirin prior to arrival.  Denies leg swelling, smoking or drug use.    Patient had recent stent placement by Dr. Camara from [place] approximately 3 weeks ago and has had a prior stent that was placed 4 months ago.  Cardiologist = Dr. Partha Weiss MD, ED Attdg:  Case d/w ED PA.  Pt seen/evaluated.   65 y/o male with PMH: CAD, 13 stents, on Plavix and aspirin, GERD, high cholesterol, STEMI at age 48, HTN, currently on ASA & Plavix; BIBA from home c/o'ing exertional mid-chest pressure discomfort & HENDRIX episodes since this afternoon.  3 - 4 episodes today, + resolved w/ rest, no cp nor SOB at rest, only upon exertion, walking outside up incline or up/down stairs at home.  Pt's most recent cardiac cath at [facility] Dr. Camara on 11/18/24 w/ + stent placed to mid LAD.  Pt took  at home PTA, took his Plavix this AM.  Pt currently pain-free at rest in ED.

## 2024-12-07 NOTE — H&P ADULT - NSICDXPASTMEDICALHX_GEN_ALL_CORE_FT
PAST MEDICAL HISTORY:  CAD (coronary artery disease)     GERD (gastroesophageal reflux disease)     H/O: depression     HLD (hyperlipidemia)     HTN (hypertension)     STEMI (ST elevation myocardial infarction) at 49 y/o     Unremarkable

## 2024-12-08 LAB
ALBUMIN SERPL ELPH-MCNC: 3.1 G/DL — LOW (ref 3.3–5)
ALP SERPL-CCNC: 85 U/L — SIGNIFICANT CHANGE UP (ref 40–120)
ALT FLD-CCNC: 39 U/L — SIGNIFICANT CHANGE UP (ref 12–78)
ANION GAP SERPL CALC-SCNC: 6 MMOL/L — SIGNIFICANT CHANGE UP (ref 5–17)
APTT BLD: 45.4 SEC — HIGH (ref 24.5–35.6)
APTT BLD: 47.4 SEC — HIGH (ref 24.5–35.6)
APTT BLD: 52.3 SEC — HIGH (ref 24.5–35.6)
AST SERPL-CCNC: 18 U/L — SIGNIFICANT CHANGE UP (ref 15–37)
BASOPHILS # BLD AUTO: 0.04 K/UL — SIGNIFICANT CHANGE UP (ref 0–0.2)
BASOPHILS NFR BLD AUTO: 0.6 % — SIGNIFICANT CHANGE UP (ref 0–2)
BILIRUB SERPL-MCNC: 0.4 MG/DL — SIGNIFICANT CHANGE UP (ref 0.2–1.2)
BUN SERPL-MCNC: 15 MG/DL — SIGNIFICANT CHANGE UP (ref 7–23)
CALCIUM SERPL-MCNC: 8.8 MG/DL — SIGNIFICANT CHANGE UP (ref 8.5–10.1)
CHLORIDE SERPL-SCNC: 111 MMOL/L — HIGH (ref 96–108)
CHOLEST SERPL-MCNC: 145 MG/DL — SIGNIFICANT CHANGE UP
CK SERPL-CCNC: 57 U/L — SIGNIFICANT CHANGE UP (ref 26–308)
CO2 SERPL-SCNC: 26 MMOL/L — SIGNIFICANT CHANGE UP (ref 22–31)
CREAT SERPL-MCNC: 0.7 MG/DL — SIGNIFICANT CHANGE UP (ref 0.5–1.3)
EGFR: 104 ML/MIN/1.73M2 — SIGNIFICANT CHANGE UP
EOSINOPHIL # BLD AUTO: 0.14 K/UL — SIGNIFICANT CHANGE UP (ref 0–0.5)
EOSINOPHIL NFR BLD AUTO: 1.9 % — SIGNIFICANT CHANGE UP (ref 0–6)
GLUCOSE SERPL-MCNC: 108 MG/DL — HIGH (ref 70–99)
HCT VFR BLD CALC: 40.6 % — SIGNIFICANT CHANGE UP (ref 39–50)
HCV AB S/CO SERPL IA: 0.07 S/CO — SIGNIFICANT CHANGE UP (ref 0–0.99)
HCV AB SERPL-IMP: SIGNIFICANT CHANGE UP
HDLC SERPL-MCNC: 35 MG/DL — LOW
HGB BLD-MCNC: 14.1 G/DL — SIGNIFICANT CHANGE UP (ref 13–17)
IMM GRANULOCYTES NFR BLD AUTO: 0.4 % — SIGNIFICANT CHANGE UP (ref 0–0.9)
LIPID PNL WITH DIRECT LDL SERPL: 61 MG/DL — SIGNIFICANT CHANGE UP
LYMPHOCYTES # BLD AUTO: 2.2 K/UL — SIGNIFICANT CHANGE UP (ref 1–3.3)
LYMPHOCYTES # BLD AUTO: 30.4 % — SIGNIFICANT CHANGE UP (ref 13–44)
MAGNESIUM SERPL-MCNC: 2 MG/DL — SIGNIFICANT CHANGE UP (ref 1.6–2.6)
MCHC RBC-ENTMCNC: 32.2 PG — SIGNIFICANT CHANGE UP (ref 27–34)
MCHC RBC-ENTMCNC: 34.7 G/DL — SIGNIFICANT CHANGE UP (ref 32–36)
MCV RBC AUTO: 92.7 FL — SIGNIFICANT CHANGE UP (ref 80–100)
MONOCYTES # BLD AUTO: 0.46 K/UL — SIGNIFICANT CHANGE UP (ref 0–0.9)
MONOCYTES NFR BLD AUTO: 6.4 % — SIGNIFICANT CHANGE UP (ref 2–14)
MRSA PCR RESULT.: SIGNIFICANT CHANGE UP
NEUTROPHILS # BLD AUTO: 4.37 K/UL — SIGNIFICANT CHANGE UP (ref 1.8–7.4)
NEUTROPHILS NFR BLD AUTO: 60.3 % — SIGNIFICANT CHANGE UP (ref 43–77)
NON HDL CHOLESTEROL: 110 MG/DL — SIGNIFICANT CHANGE UP
PHOSPHATE SERPL-MCNC: 4 MG/DL — SIGNIFICANT CHANGE UP (ref 2.5–4.5)
PLATELET # BLD AUTO: 173 K/UL — SIGNIFICANT CHANGE UP (ref 150–400)
POTASSIUM SERPL-MCNC: 3.6 MMOL/L — SIGNIFICANT CHANGE UP (ref 3.5–5.3)
POTASSIUM SERPL-SCNC: 3.6 MMOL/L — SIGNIFICANT CHANGE UP (ref 3.5–5.3)
PROT SERPL-MCNC: 6.3 GM/DL — SIGNIFICANT CHANGE UP (ref 6–8.3)
RBC # BLD: 4.38 M/UL — SIGNIFICANT CHANGE UP (ref 4.2–5.8)
RBC # FLD: 12.7 % — SIGNIFICANT CHANGE UP (ref 10.3–14.5)
S AUREUS DNA NOSE QL NAA+PROBE: SIGNIFICANT CHANGE UP
SODIUM SERPL-SCNC: 143 MMOL/L — SIGNIFICANT CHANGE UP (ref 135–145)
TRIGL SERPL-MCNC: 310 MG/DL — HIGH
TROPONIN I, HIGH SENSITIVITY RESULT: 6.13 NG/L — SIGNIFICANT CHANGE UP
TSH SERPL-MCNC: 2.15 UU/ML — SIGNIFICANT CHANGE UP (ref 0.34–4.82)
WBC # BLD: 7.24 K/UL — SIGNIFICANT CHANGE UP (ref 3.8–10.5)
WBC # FLD AUTO: 7.24 K/UL — SIGNIFICANT CHANGE UP (ref 3.8–10.5)

## 2024-12-08 PROCEDURE — 93306 TTE W/DOPPLER COMPLETE: CPT | Mod: 26

## 2024-12-08 PROCEDURE — 99222 1ST HOSP IP/OBS MODERATE 55: CPT

## 2024-12-08 RX ORDER — LAMOTRIGINE 50 MG/1
200 TABLET, EXTENDED RELEASE ORAL DAILY
Refills: 0 | Status: DISCONTINUED | OUTPATIENT
Start: 2024-12-08 | End: 2024-12-09

## 2024-12-08 RX ORDER — HEPARIN SODIUM,PORCINE 1000/ML
1 VIAL (ML) INJECTION
Refills: 0 | DISCHARGE
Start: 2024-12-08

## 2024-12-08 RX ORDER — ACETAMINOPHEN, DIPHENHYDRAMINE HCL, PHENYLEPHRINE HCL 325; 25; 5 MG/1; MG/1; MG/1
3 TABLET ORAL AT BEDTIME
Refills: 0 | Status: DISCONTINUED | OUTPATIENT
Start: 2024-12-08 | End: 2024-12-09

## 2024-12-08 RX ORDER — PANTOPRAZOLE SODIUM 40 MG/1
1 TABLET, DELAYED RELEASE ORAL
Refills: 0 | DISCHARGE
Start: 2024-12-08

## 2024-12-08 RX ADMIN — VENLAFAXINE HYDROCHLORIDE 150 MILLIGRAM(S): 75 CAPSULE, EXTENDED RELEASE ORAL at 21:16

## 2024-12-08 RX ADMIN — Medication 1 MILLIGRAM(S): at 00:47

## 2024-12-08 RX ADMIN — METOPROLOL TARTRATE 50 MILLIGRAM(S): 100 TABLET, FILM COATED ORAL at 21:17

## 2024-12-08 RX ADMIN — Medication 80 MILLIGRAM(S): at 21:17

## 2024-12-08 RX ADMIN — ACETAMINOPHEN, DIPHENHYDRAMINE HCL, PHENYLEPHRINE HCL 3 MILLIGRAM(S): 325; 25; 5 TABLET ORAL at 00:47

## 2024-12-08 RX ADMIN — Medication UNIT(S)/HR: at 19:22

## 2024-12-08 RX ADMIN — LAMOTRIGINE 200 MILLIGRAM(S): 50 TABLET, EXTENDED RELEASE ORAL at 12:38

## 2024-12-08 RX ADMIN — Medication 1400 UNIT(S)/HR: at 18:35

## 2024-12-08 RX ADMIN — CLOPIDOGREL 75 MILLIGRAM(S): 75 TABLET, FILM COATED ORAL at 10:08

## 2024-12-08 RX ADMIN — ACETAMINOPHEN, DIPHENHYDRAMINE HCL, PHENYLEPHRINE HCL 3 MILLIGRAM(S): 325; 25; 5 TABLET ORAL at 23:35

## 2024-12-08 RX ADMIN — Medication 81 MILLIGRAM(S): at 10:08

## 2024-12-08 RX ADMIN — Medication 1000 UNIT(S)/HR: at 00:33

## 2024-12-08 RX ADMIN — Medication 1400 UNIT(S)/HR: at 12:40

## 2024-12-08 RX ADMIN — Medication 1 MILLIGRAM(S): at 22:05

## 2024-12-08 RX ADMIN — Medication 1200 UNIT(S)/HR: at 07:26

## 2024-12-08 RX ADMIN — Medication 1200 UNIT(S)/HR: at 05:57

## 2024-12-08 RX ADMIN — Medication 80 MILLIGRAM(S): at 00:11

## 2024-12-08 RX ADMIN — METOPROLOL TARTRATE 50 MILLIGRAM(S): 100 TABLET, FILM COATED ORAL at 00:12

## 2024-12-08 NOTE — DISCHARGE NOTE PROVIDER - HOSPITAL COURSE
FROM H&P:    "64M w/ h/o STEMI at age 48, CAD s/p PCI with 13 stents (Last PCI 11/18/24, ALEX to mLAD), on Plavix and aspirin, GERD, high cholesterol, HTN, presents with complaints of exertional substernal chest pain associated with dyspnea for 1 day. Patient noticed transient chest pains today while climbing uphill which improved with rest, and also when climbing stairs. Has been compliant with his DAPT. No other acute complaints. Patient took 325 of aspirin prior to arrival.  Denies leg swelling, smoking or drug use. Denies other ROS. In ED vitals stable, chest pain free. CBC/CMP unremarkable. initial trop negative. ECG noted for new ST-depressions in inferior leads. Case discussed with cardiology by ED provider. Patient started on hep gtt and admitted to  for further care.   "      12/8: Chest pain resolved. Denies fever, chills, chest pain, SOB, nausea, vomiting, abdominal pain/discomfort.     # Chest pain evaluating for ACS/Unstable angina (acute coronary syndrome).   Troponins  negative x 2  ECG: ST depression in inferior leads.   ECHO   C/w hep gtt  C/w aspirin, statin, BB, sublingual nitro  PPI for GI prophylaxis.   LDL 61  TSH WNL  Cardio Consult placed.  Tentative transfer in am for cath with Dr. Camara    # CAD (coronary artery disease).   ·  Plan: C/w aspirin, statin, BB, sublingual nitro.    # Anxiety and depression.   ·  Plan: PRN xanax, effexor and lamictal.    DVT ppx - hep gtt.    Discharge to [facility] in stable condition for further evaluation including anticipated LHC tentatively 12/9.

## 2024-12-08 NOTE — ED ADULT NURSE REASSESSMENT NOTE - NS ED NURSE REASSESS COMMENT FT1
Writer s/w MD Macario @ 0000hrs regarding repeat EKG ordered. MD notified that EKG in progress, hold patient in ED until provider can read it, or OK to send pt to unit? MD states pt can go up to CCU, ensure EKG in chart. Marco Antonio Galan completed EKG, placed in chart to accompany pt to CCU.

## 2024-12-08 NOTE — DISCHARGE NOTE PROVIDER - CARE PROVIDER_API CALL
Chet Camara  Interventional Cardiology  [address] [place] [address]  Phone: [phone]  Fax: [phone]  Established Patient  Scheduled Appointment: 12/09/2024

## 2024-12-08 NOTE — DISCHARGE NOTE PROVIDER - NSDCMRMEDTOKEN_GEN_ALL_CORE_FT
ALPRAZolam 1 mg oral tablet: 1 tab(s) orally 3 times a day as needed for  aspirin 81 mg oral delayed release tablet: 1 tab(s) orally once a day  clonazePAM 1 mg oral tablet: 1 tab(s) orally in the morning and at bedtime as needed for  Effexor  mg oral capsule, extended release: 1 cap(s) orally once a day  heparin 100 units/mL-D5% intravenous solution: 1 unit(s) intravenous every 6 hours Continue as per system ACS protocol  lamoTRIgine 100 mg oral tablet: 2 tab(s) orally once a day  Lipitor 80 mg oral tablet: 1 tab(s) orally once a day  metoprolol succinate 50 mg oral tablet, extended release: 1 tab(s) orally once a day  pantoprazole 40 mg oral delayed release tablet: 1 tab(s) orally once a day (before a meal)  Plavix 75 mg oral tablet: 1 tab(s) orally once a day

## 2024-12-08 NOTE — PROGRESS NOTE ADULT - SUBJECTIVE AND OBJECTIVE BOX
CHIEF COMPLAINT: Chest pain    SUBJECTIVE/SIGNIFICANT INTERVAL EVENTS/OVERNIGHT EVENTS:    12/8: Chest pain resolved. Denies fever, chills, chest pain, SOB, nausea, vomiting, abdominal pain/discomfort.     Review of Systems: 14 Point review of systems reviewed and reported as negative unless otherwise stated above    FROM H&P:  "64M w/ h/o STEMI at age 48, CAD s/p PCI with 13 stents (Last PCI 11/18/24, ALEX to mLAD), on Plavix and aspirin, GERD, high cholesterol, HTN, presents with complaints of exertional substernal chest pain associated with dyspnea for 1 day. Patient noticed transient chest pains today while climbing uphill which improved with rest, and also when climbing stairs. Has been compliant with his DAPT. No other acute complaints. Patient took 325 of aspirin prior to arrival.  Denies leg swelling, smoking or drug use. Denies other ROS. In ED vitals stable, chest pain free. CBC/CMP unremarkable. initial trop negative. ECG noted for new ST-depressions in inferior leads. Case discussed with cardiology by ED provider. Patient started on hep gtt and admitted to  for further care.   "    PHYSICAL EXAM:    T(C): 37.1 (12-08-24 @ 00:45), Max: 37.1 (12-08-24 @ 00:45)  HR: 72 (12-08-24 @ 09:40) (67 - 101)  BP: 145/82 (12-08-24 @ 09:40) (109/76 - 185/75)  RR: 16 (12-08-24 @ 09:40) (12 - 23)  SpO2: 99% (12-08-24 @ 09:40) (93% - 99%)    General: AAOx3; NAD  Head: AT/NC  ENT: Moist Mucous Membranes; No Injury  Eyes: EOMI; PERRL  Neck: Non-tender; No JVD  CVS: RRR, S1&S2, No murmur, No edema  Respiratory: Lungs CTA B/L; Normal Respiratory Effort  Abdomen/GI: Soft, non-tender, non-distended, no guarding, no rebound, normal bowel sounds  Extremities: No cyanosis, No clubbing, No edema  Neuro: AAOx3, CNII-XII grossly intact, non-focal  Psych: Appropriate, Cooperative,  Skin: Clean, Dry and Intact      LABS:                          14.1   7.24  )-----------( 173      ( 08 Dec 2024 04:06 )             40.6     12-08    143  |  111[H]  |  15  ----------------------------<  108[H]  3.6   |  26  |  0.70    Ca    8.8      08 Dec 2024 04:06  Phos  4.0     12-08  Mg     2.0     12-08    TPro  6.3  /  Alb  3.1[L]  /  TBili  0.4  /  DBili  x   /  AST  18  /  ALT  39  /  AlkPhos  85  12-08      CAPILLARY BLOOD GLUCOSE      LDL Cholesterol Calculated: 61: Thyroid Stimulating Hormone, Serum (12.07.24 @ 23:25)   Thyroid Stimulating Hormone, Serum: 2.15 uU/mLTroponin I, High Sensitivity (12.07.24 @ 23:25)   Troponin I, High Sensitivity Result: 6.13: D-Dimer Assay, Quantitative (12.07.24 @ 20:40)   D-Dimer Assay, Quantitative: <150: D        RADIOLOGY:  < from: Xray Chest 2 Views PA/Lat (12.07.24 @ 20:22) >  IMPRESSION:  No radiographic evidence of active chest disease..    < end of copied text >      ECHO:            I personally reviewed labs, imaging, ekg, orders and vitals.    Discussed case with:  [X]RN  [X]CM/SW  [X]Patient  []Family  [X]Specialist: Cardiology

## 2024-12-08 NOTE — DISCHARGE NOTE PROVIDER - CARE PROVIDERS DIRECT ADDRESSES
[email or web address]

## 2024-12-08 NOTE — CONSULT NOTE ADULT - SUBJECTIVE AND OBJECTIVE BOX
Cardiology Consultation    HPI: 64M w/ h/o STEMI at age 48, CAD s/p PCI with 13 stents (Last PCI 11/18/24, ALEX to mLAD), on Plavix and aspirin, GERD, high cholesterol, HTN, presents with complaints of exertional substernal chest pain associated with dyspnea for 1 day. Patient noticed transient chest pains today while climbing uphill which improved with rest, and also when climbing stairs. Has been compliant with his DAPT. No other acute complaints. Patient took 325 of aspirin prior to arrival.  Denies leg swelling, smoking or drug use. Denies other ROS. In ED vitals stable, chest pain free. CBC/CMP unremarkable. initial trop negative. ECG noted for new ST-depressions in inferior leads. Case discussed with cardiology by ED provider. Patient started on hep gtt and admitted to  for further care.  (07 Dec 2024 23:03)    12/8. Chart reviewed. Pt seen/examined in CCU. Significant stable angina with minimal exertion.   No CP at rest. No SOB. SR on tele.   Pt had ALEX x 1 to mLAD and POBA to dLAD 11/24.     PAST MEDICAL & SURGICAL HISTORY:  CAD (coronary artery disease)  HTN (hypertension)  H/O: depression  STEMI (ST elevation myocardial infarction)  at 47 y/o  HLD (hyperlipidemia)  GERD (gastroesophageal reflux disease)  History of coronary artery stent placement  History of brachytherapy    Allergies  No Known Allergies    SOCIAL HISTORY: Denies tobacco, etoh abuse or illicit drug use    FAMILY HISTORY:  Family history of CABG (Father)    MEDICATIONS  (STANDING):  aspirin enteric coated 81 milliGRAM(s) Oral daily  atorvastatin 80 milliGRAM(s) Oral at bedtime  clopidogrel Tablet 75 milliGRAM(s) Oral daily  heparin  Infusion.  Unit(s)/Hr (10 mL/Hr) IV Continuous <Continuous>  lamoTRIgine 100 milliGRAM(s) Oral two times a day  metoprolol succinate ER 50 milliGRAM(s) Oral at bedtime  pantoprazole    Tablet 40 milliGRAM(s) Oral before breakfast  venlafaxine XR. 150 milliGRAM(s) Oral daily    MEDICATIONS  (PRN):  acetaminophen     Tablet .. 650 milliGRAM(s) Oral every 6 hours PRN Temp greater or equal to 38C (100.4F), Mild Pain (1 - 3)  ALPRAZolam 1 milliGRAM(s) Oral three times a day PRN Anxiety  aluminum hydroxide/magnesium hydroxide/simethicone Suspension 30 milliLiter(s) Oral every 4 hours PRN Dyspepsia  heparin   Injectable 6000 Unit(s) IV Push every 6 hours PRN For aPTT less than 40  melatonin 3 milliGRAM(s) Oral at bedtime PRN Insomnia  nitroglycerin     SubLingual 0.4 milliGRAM(s) SubLingual every 5 minutes PRN Chest Pain  ondansetron Injectable 4 milliGRAM(s) IV Push every 8 hours PRN Nausea and/or Vomiting      Vital Signs Last 24 Hrs  T(C): 37.1 (08 Dec 2024 00:45), Max: 37.1 (08 Dec 2024 00:45)  T(F): 98.7 (08 Dec 2024 00:45), Max: 98.7 (08 Dec 2024 00:45)  HR: 67 (08 Dec 2024 05:00) (67 - 101)  BP: 121/85 (08 Dec 2024 05:00) (109/76 - 185/75)  BP(mean): 95 (08 Dec 2024 05:00) (84 - 100)  RR: 19 (08 Dec 2024 05:00) (15 - 23)  SpO2: 93% (08 Dec 2024 01:00) (93% - 99%)    Parameters below as of 07 Dec 2024 23:40  Patient On (Oxygen Delivery Method): room air        REVIEW OF SYSTEMS:    CONSTITUTIONAL:  As per HPI.  HEENT:  Eyes:  No diplopia or blurred vision. ENT:  No earache, sore throat or runny nose.  CARDIOVASCULAR:  No pressure, squeezing, strangling, tightness, heaviness or aching about the chest, neck, axilla or epigastrium.  RESPIRATORY:  No cough, shortness of breath, PND or orthopnea.  GASTROINTESTINAL:  No nausea, vomiting or diarrhea.  GENITOURINARY:  No dysuria, frequency or urgency.  MUSCULOSKELETAL:  As per HPI.  SKIN:  No change in skin, hair or nails.  NEUROLOGIC:  No paresthesias, fasciculations, seizures or weakness.  PSYCHIATRIC:  No disorder of thought or mood.  ENDOCRINE:  No heat or cold intolerance, polyuria or polydipsia.  HEMATOLOGICAL:  No easy bruising or bleedings.     PHYSICAL EXAMINATION:    GENERAL APPEARANCE:  Pt. is not currently dyspneic, in no distress. Pt. is alert, oriented, and pleasant.  HEENT:  Pupils are normal and react normally. No icterus. Mucous membranes well colored.  NECK:  Supple. No lymphadenopathy. Jugular venous pressure not elevated. Carotids equal.   HEART:   The cardiac impulse has a normal quality. There are no murmurs, rubs or gallops noted  CHEST:  Chest is clear to auscultation. Normal respiratory effort.  ABDOMEN:  Soft and nontender.   EXTREMITIES:  There is no edema.   SKIN:  No rash or significant lesions are noted.    I&O's Summary    07 Dec 2024 07:01  -  08 Dec 2024 07:00  --------------------------------------------------------  IN: 0 mL / OUT: 100 mL / NET: -100 mL        LABS:                        14.1   7.24  )-----------( 173      ( 08 Dec 2024 04:06 )             40.6     12-08    143  |  111[H]  |  15  ----------------------------<  108[H]  3.6   |  26  |  0.70    Ca    8.8      08 Dec 2024 04:06  Phos  4.0     12-08  Mg     2.0     12-08    TPro  6.3  /  Alb  3.1[L]  /  TBili  0.4  /  DBili  x   /  AST  18  /  ALT  39  /  AlkPhos  85  12-08    LIVER FUNCTIONS - ( 08 Dec 2024 04:06 )  Alb: 3.1 g/dL / Pro: 6.3 gm/dL / ALK PHOS: 85 U/L / ALT: 39 U/L / AST: 18 U/L / GGT: x           PT/INR - ( 07 Dec 2024 20:40 )   PT: 11.3 sec;   INR: 0.98 ratio         PTT - ( 08 Dec 2024 04:06 )  PTT:45.4 sec      Urinalysis Basic - ( 08 Dec 2024 04:06 )    Color: x / Appearance: x / SG: x / pH: x  Gluc: 108 mg/dL / Ketone: x  / Bili: x / Urobili: x   Blood: x / Protein: x / Nitrite: x   Leuk Esterase: x / RBC: x / WBC x   Sq Epi: x / Non Sq Epi: x / Bacteria: x    EKG: < from: 12 Lead ECG (11.18.24 @ 17:24) >  Diagnosis Line NORMAL SINUS RHYTHM  NONSPECIFIC T WAVE ABNORMALITY  ABNORMAL ECG  WHEN COMPARED WITH ECG OF 18-NOV-2024 10:14,  NO SIGNIFICANT CHANGE WAS FOUND    TELEMETRY: SR    CARDIAC TESTS: < from: Cardiac Catheterization (11.18.24 @ 16:36) >  Conclusions:   Successful PCI with ALEX to the focal mid LAD restenosis and balloon  angioplasty of the distal LAD restenosis.  DAPT    < from: Cardiac Catheterization (11.18.24 @ 16:36) >  Interventional Findings:     Interventional Details   Mid left anterior descending: The initial stenosis was 90 %. Guidewire  crossing was successful.    A successful Balloon angioplasty was performed using a 6FR JL 4.0  LAUNCHER, a CLEOPATRA [id number], and a SAPPHIRE 2.0 X  20.      The inflation pressure was 18 jg for the duration of 9.0 seconds.     A successful Drug Eluting Stent was deployed using a 3.0 X 18 ORSIRO  MISSION.    The inflation pressure was 20 jg for the duration of 6.0 seconds.     A successful Balloon angioplasty was performed using a 3.25 X 15  EUPHORA NC.    The inflation pressure was 22 jg for the duration of 9.0 seconds.     The inflation pressure was 22 jg for the duration of 9.0 seconds.     < from: TTE Echo Complete w/o Contrast w/ Doppler (09.02.23 @ 07:42) >   The mitral valve leaflets appear thin and normal.   Trace mitral regurgitation is present.   The aortic valve is trileaflet with thin pliable leaflets.   The tricuspid valve leaflets are thin and pliable; valve motion is   normal.   Trace tricuspid valve regurgitation is present.   Normal appearing left atrium.   The left ventricle is normal in size, wall thickness, wall motion and   contractility.   Estimated left ventricular ejection fraction is 65-70 %.   Normal appearing right atrium.   Normal appearing right ventricle structure and function.    RADIOLOGY & ADDITIONAL STUDIES: < from: Xray Chest 2 Views PA/Lat (12.07.24 @ 20:22) >  IMPRESSION:  No radiographic evidence of active chest disease..    ASSESSMENT & PLAN:

## 2024-12-08 NOTE — DISCHARGE NOTE PROVIDER - NSDCCPCAREPLAN_GEN_ALL_CORE_FT
PRINCIPAL DISCHARGE DIAGNOSIS  Diagnosis: Unstable angina  Assessment and Plan of Treatment:       SECONDARY DISCHARGE DIAGNOSES  Diagnosis: CAD (coronary artery disease)  Assessment and Plan of Treatment:

## 2024-12-09 ENCOUNTER — TRANSCRIPTION ENCOUNTER (OUTPATIENT)
Age: 64
End: 2024-12-09

## 2024-12-09 ENCOUNTER — INPATIENT (INPATIENT)
Facility: HOSPITAL | Age: 64
LOS: 0 days | Discharge: HOME | DRG: 287 | End: 2024-12-09
Attending: INTERNAL MEDICINE | Admitting: INTERNAL MEDICINE
Payer: COMMERCIAL

## 2024-12-09 ENCOUNTER — NON-APPOINTMENT (OUTPATIENT)
Age: 64
End: 2024-12-09

## 2024-12-09 VITALS
OXYGEN SATURATION: 99 % | HEART RATE: 75 BPM | SYSTOLIC BLOOD PRESSURE: 134 MMHG | TEMPERATURE: 98 F | DIASTOLIC BLOOD PRESSURE: 92 MMHG | RESPIRATION RATE: 20 BRPM

## 2024-12-09 VITALS
DIASTOLIC BLOOD PRESSURE: 74 MMHG | SYSTOLIC BLOOD PRESSURE: 133 MMHG | HEART RATE: 80 BPM | TEMPERATURE: 98 F | RESPIRATION RATE: 17 BRPM | OXYGEN SATURATION: 92 %

## 2024-12-09 VITALS
SYSTOLIC BLOOD PRESSURE: 128 MMHG | HEART RATE: 66 BPM | WEIGHT: 250 LBS | RESPIRATION RATE: 18 BRPM | DIASTOLIC BLOOD PRESSURE: 71 MMHG | HEIGHT: 75 IN | OXYGEN SATURATION: 98 % | TEMPERATURE: 98 F

## 2024-12-09 DIAGNOSIS — Z92.3 PERSONAL HISTORY OF IRRADIATION: Chronic | ICD-10-CM

## 2024-12-09 DIAGNOSIS — I20.0 UNSTABLE ANGINA: ICD-10-CM

## 2024-12-09 DIAGNOSIS — Z95.5 PRESENCE OF CORONARY ANGIOPLASTY IMPLANT AND GRAFT: Chronic | ICD-10-CM

## 2024-12-09 LAB
ANION GAP SERPL CALC-SCNC: 2 MMOL/L — LOW (ref 5–17)
APTT BLD: 55.4 SEC — HIGH (ref 24.5–35.6)
APTT BLD: 57.9 SEC — HIGH (ref 24.5–35.6)
APTT BLD: 59.3 SEC — HIGH (ref 24.5–35.6)
BUN SERPL-MCNC: 12 MG/DL — SIGNIFICANT CHANGE UP (ref 7–23)
CALCIUM SERPL-MCNC: 9 MG/DL — SIGNIFICANT CHANGE UP (ref 8.5–10.1)
CHLORIDE SERPL-SCNC: 112 MMOL/L — HIGH (ref 96–108)
CO2 SERPL-SCNC: 26 MMOL/L — SIGNIFICANT CHANGE UP (ref 22–31)
CREAT SERPL-MCNC: 0.76 MG/DL — SIGNIFICANT CHANGE UP (ref 0.5–1.3)
EGFR: 101 ML/MIN/1.73M2 — SIGNIFICANT CHANGE UP
GLUCOSE SERPL-MCNC: 111 MG/DL — HIGH (ref 70–99)
HCT VFR BLD CALC: 43.3 % — SIGNIFICANT CHANGE UP (ref 39–50)
HGB BLD-MCNC: 15.1 G/DL — SIGNIFICANT CHANGE UP (ref 13–17)
INR BLD: 1.01 RATIO — SIGNIFICANT CHANGE UP (ref 0.85–1.16)
MAGNESIUM SERPL-MCNC: 2 MG/DL — SIGNIFICANT CHANGE UP (ref 1.6–2.6)
MCHC RBC-ENTMCNC: 32 PG — SIGNIFICANT CHANGE UP (ref 27–34)
MCHC RBC-ENTMCNC: 34.9 G/DL — SIGNIFICANT CHANGE UP (ref 32–36)
MCV RBC AUTO: 91.7 FL — SIGNIFICANT CHANGE UP (ref 80–100)
PHOSPHATE SERPL-MCNC: 3.9 MG/DL — SIGNIFICANT CHANGE UP (ref 2.5–4.5)
PLATELET # BLD AUTO: 164 K/UL — SIGNIFICANT CHANGE UP (ref 150–400)
POTASSIUM SERPL-MCNC: 3.5 MMOL/L — SIGNIFICANT CHANGE UP (ref 3.5–5.3)
POTASSIUM SERPL-SCNC: 3.5 MMOL/L — SIGNIFICANT CHANGE UP (ref 3.5–5.3)
PROTHROM AB SERPL-ACNC: 11.6 SEC — SIGNIFICANT CHANGE UP (ref 9.9–13.4)
RBC # BLD: 4.72 M/UL — SIGNIFICANT CHANGE UP (ref 4.2–5.8)
RBC # FLD: 12.3 % — SIGNIFICANT CHANGE UP (ref 10.3–14.5)
SODIUM SERPL-SCNC: 140 MMOL/L — SIGNIFICANT CHANGE UP (ref 135–145)
WBC # BLD: 7.76 K/UL — SIGNIFICANT CHANGE UP (ref 3.8–10.5)
WBC # FLD AUTO: 7.76 K/UL — SIGNIFICANT CHANGE UP (ref 3.8–10.5)

## 2024-12-09 PROCEDURE — C1887: CPT

## 2024-12-09 PROCEDURE — 99152 MOD SED SAME PHYS/QHP 5/>YRS: CPT

## 2024-12-09 PROCEDURE — C1894: CPT

## 2024-12-09 PROCEDURE — 93454 CORONARY ARTERY ANGIO S&I: CPT | Mod: 26

## 2024-12-09 PROCEDURE — 85610 PROTHROMBIN TIME: CPT

## 2024-12-09 PROCEDURE — 85730 THROMBOPLASTIN TIME PARTIAL: CPT

## 2024-12-09 PROCEDURE — 99238 HOSP IP/OBS DSCHRG MGMT 30/<: CPT

## 2024-12-09 PROCEDURE — C1769: CPT

## 2024-12-09 PROCEDURE — 93454 CORONARY ARTERY ANGIO S&I: CPT

## 2024-12-09 RX ORDER — METOPROLOL TARTRATE 100 MG/1
50 TABLET, FILM COATED ORAL DAILY
Refills: 0 | Status: DISCONTINUED | OUTPATIENT
Start: 2024-12-09 | End: 2024-12-09

## 2024-12-09 RX ORDER — SODIUM CHLORIDE 9 MG/ML
1000 INJECTION, SOLUTION INTRAMUSCULAR; INTRAVENOUS; SUBCUTANEOUS
Refills: 0 | Status: DISCONTINUED | OUTPATIENT
Start: 2024-12-09 | End: 2024-12-09

## 2024-12-09 RX ORDER — PANTOPRAZOLE SODIUM 40 MG/1
40 TABLET, DELAYED RELEASE ORAL
Refills: 0 | Status: DISCONTINUED | OUTPATIENT
Start: 2024-12-09 | End: 2024-12-09

## 2024-12-09 RX ORDER — ALPRAZOLAM 0.5 MG
1 TABLET ORAL
Refills: 0 | DISCHARGE

## 2024-12-09 RX ORDER — LAMOTRIGINE 50 MG/1
200 TABLET, EXTENDED RELEASE ORAL DAILY
Refills: 0 | Status: DISCONTINUED | OUTPATIENT
Start: 2024-12-09 | End: 2024-12-09

## 2024-12-09 RX ORDER — CLOPIDOGREL 75 MG/1
75 TABLET, FILM COATED ORAL DAILY
Refills: 0 | Status: DISCONTINUED | OUTPATIENT
Start: 2024-12-09 | End: 2024-12-09

## 2024-12-09 RX ORDER — CLONAZEPAM 0.12 MG/1
1 TABLET, ORALLY DISINTEGRATING ORAL
Refills: 0 | DISCHARGE

## 2024-12-09 RX ORDER — SODIUM CHLORIDE 9 MG/ML
250 INJECTION, SOLUTION INTRAMUSCULAR; INTRAVENOUS; SUBCUTANEOUS ONCE
Refills: 0 | Status: COMPLETED | OUTPATIENT
Start: 2024-12-09 | End: 2024-12-09

## 2024-12-09 RX ORDER — VENLAFAXINE HYDROCHLORIDE 75 MG/1
150 CAPSULE, EXTENDED RELEASE ORAL DAILY
Refills: 0 | Status: DISCONTINUED | OUTPATIENT
Start: 2024-12-09 | End: 2024-12-09

## 2024-12-09 RX ORDER — LAMOTRIGINE 50 MG/1
2 TABLET, EXTENDED RELEASE ORAL
Refills: 0 | DISCHARGE

## 2024-12-09 RX ORDER — HEPARIN SODIUM,PORCINE 1000/ML
VIAL (ML) INJECTION
Qty: 25000 | Refills: 0 | Status: DISCONTINUED | OUTPATIENT
Start: 2024-12-09 | End: 2024-12-09

## 2024-12-09 RX ADMIN — CLOPIDOGREL 75 MILLIGRAM(S): 75 TABLET, FILM COATED ORAL at 10:52

## 2024-12-09 RX ADMIN — VENLAFAXINE HYDROCHLORIDE 150 MILLIGRAM(S): 75 CAPSULE, EXTENDED RELEASE ORAL at 21:26

## 2024-12-09 RX ADMIN — PANTOPRAZOLE SODIUM 40 MILLIGRAM(S): 40 TABLET, DELAYED RELEASE ORAL at 14:28

## 2024-12-09 RX ADMIN — Medication 80 MILLIGRAM(S): at 21:26

## 2024-12-09 RX ADMIN — LAMOTRIGINE 200 MILLIGRAM(S): 50 TABLET, EXTENDED RELEASE ORAL at 10:52

## 2024-12-09 RX ADMIN — Medication 81 MILLIGRAM(S): at 10:52

## 2024-12-09 RX ADMIN — Medication UNIT(S)/HR: at 07:11

## 2024-12-09 RX ADMIN — Medication UNIT(S)/HR: at 02:03

## 2024-12-09 RX ADMIN — METOPROLOL TARTRATE 50 MILLIGRAM(S): 100 TABLET, FILM COATED ORAL at 21:26

## 2024-12-09 RX ADMIN — SODIUM CHLORIDE 50 MILLILITER(S): 9 INJECTION, SOLUTION INTRAMUSCULAR; INTRAVENOUS; SUBCUTANEOUS at 09:59

## 2024-12-09 RX ADMIN — SODIUM CHLORIDE 750 MILLILITER(S): 9 INJECTION, SOLUTION INTRAMUSCULAR; INTRAVENOUS; SUBCUTANEOUS at 09:44

## 2024-12-09 NOTE — DISCHARGE NOTE NURSING/CASE MANAGEMENT/SOCIAL WORK - NSDCPEFALRISK_GEN_ALL_CORE
For information on Fall & Injury Prevention, visit: [email or web address] OR  [email or web address] OR  https://www.cdc.gov/steadi/patient.html

## 2024-12-09 NOTE — DISCHARGE NOTE NURSING/CASE MANAGEMENT/SOCIAL WORK - PATIENT PORTAL LINK FT
You can access the FollowMyHealth Patient Portal offered by [facility] by registering at the following website: [email or web address]. By joining [employer]’s FollowMyHealth portal, you will also be able to view your health information using other applications (apps) compatible with our system.

## 2024-12-09 NOTE — PROGRESS NOTE ADULT - ASSESSMENT
MEDICATIONS  (STANDING):  aspirin enteric coated 81 milliGRAM(s) Oral daily  atorvastatin 80 milliGRAM(s) Oral at bedtime  clopidogrel Tablet 75 milliGRAM(s) Oral daily  heparin  Infusion.  Unit(s)/Hr (10 mL/Hr) IV Continuous <Continuous>  lamoTRIgine 100 milliGRAM(s) Oral two times a day  metoprolol succinate ER 50 milliGRAM(s) Oral at bedtime  pantoprazole    Tablet 40 milliGRAM(s) Oral before breakfast  venlafaxine XR. 150 milliGRAM(s) Oral daily    MEDICATIONS  (PRN):  acetaminophen     Tablet .. 650 milliGRAM(s) Oral every 6 hours PRN Temp greater or equal to 38C (100.4F), Mild Pain (1 - 3)  ALPRAZolam 1 milliGRAM(s) Oral three times a day PRN Anxiety  aluminum hydroxide/magnesium hydroxide/simethicone Suspension 30 milliLiter(s) Oral every 4 hours PRN Dyspepsia  heparin   Injectable 6000 Unit(s) IV Push every 6 hours PRN For aPTT less than 40  melatonin 3 milliGRAM(s) Oral at bedtime PRN Insomnia  nitroglycerin     SubLingual 0.4 milliGRAM(s) SubLingual every 5 minutes PRN Chest Pain  ondansetron Injectable 4 milliGRAM(s) IV Push every 8 hours PRN Nausea and/or Vomiting      # Chest pain evaluating for ACS/Unstable angina (acute coronary syndrome).   Troponins  negative x 2  ECG: ST depression in inferior leads.   ECHO   C/w hep gtt  C/w aspirin, statin, BB, sublingual nitro  PPI for GI prophylaxis.   LDL 61  TSH WNL  Cardio Consult placed.  Tentative transfer in am for cath with Dr. Camara    # CAD (coronary artery disease).   ·  Plan: C/w aspirin, statin, BB, sublingual nitro.    # Anxiety and depression.   ·  Plan: PRN xanax, effexor and lamictal.    DVT ppx - hep gtt.    I spent a total of 51 minutes in face-to-face time with the patient and on the floor managing patient including coordination of care. Overall 50% of the time spent in discussion of the diagnosis, counseling and treatment plan.   
A/P: 64M w/ h/o STEMI at age 48, CAD s/p PCI with 13 stents (Last PCI 11/18/24, ALEX to mLAD), on Plavix and aspirin, GERD, high cholesterol, HTN, presents with complaints of exertional substernal chest pain with exertion.    1. CP/stable angina. Will arrange for transfer to Dr. Camara for [facility] tomorrow AM. Cont IV heparin for 48hrs.   Cont out regimen which includes DAPT, statin. No CP at present.  2Decho 12/8 with normal LV fxn, mild valvular regurgitation.  Awaiting transfer to [facility] for C today.     2. Hypertension. BP well controlled. Cont current regimen.    3. Dyslipidemia. Cont statin for goal LDL of <70.    4. DVT proph.

## 2024-12-09 NOTE — DISCHARGE NOTE NURSING/CASE MANAGEMENT/SOCIAL WORK - NSDCVIVACCINE_GEN_ALL_CORE_FT
Tdap; 14-Apr-2024 15:53; Elissa oPtter (RN); [employer]; [id number] (Exp. Date: 06-Dec-2024); IntraMuscular; Deltoid Left.; 0.5 milliLiter(s); VIS (VIS Published: 09-May-2013, VIS Presented: 14-Apr-2024);

## 2024-12-09 NOTE — PROGRESS NOTE ADULT - SUBJECTIVE AND OBJECTIVE BOX
Cardiology Progress Note    HPI: 64M w/ h/o STEMI at age 48, CAD s/p PCI with 13 stents (Last PCI 11/18/24, ALEX to mLAD), on Plavix and aspirin, GERD, high cholesterol, HTN, presents with complaints of exertional substernal chest pain associated with dyspnea for 1 day. Patient noticed transient chest pains today while climbing uphill which improved with rest, and also when climbing stairs. Has been compliant with his DAPT. No other acute complaints. Patient took 325 of aspirin prior to arrival.  Denies leg swelling, smoking or drug use. Denies other ROS. In ED vitals stable, chest pain free. CBC/CMP unremarkable. initial trop negative. ECG noted for new ST-depressions in inferior leads. Case discussed with cardiology by ED provider. Patient started on hep gtt and admitted to  for further care.  (07 Dec 2024 23:03)    12/8. Chart reviewed. Pt seen/examined in CCU. Significant stable angina with minimal exertion.   No CP at rest. No SOB. SR on tele.   Pt had ALEX x 1 to mLAD and POBA to dLAD 11/24.     12/9. No CP overnight. No SOB. SR on tele. Awaiting transfer to [facility] this AM for [facility].  No new complaints.    PAST MEDICAL & SURGICAL HISTORY:  CAD (coronary artery disease)  HTN (hypertension)  H/O: depression  STEMI (ST elevation myocardial infarction)  at 49 y/o  HLD (hyperlipidemia)  GERD (gastroesophageal reflux disease)  History of coronary artery stent placement  History of brachytherapy    Allergies  No Known Allergies    SOCIAL HISTORY: Denies tobacco, etoh abuse or illicit drug use    FAMILY HISTORY:  Family history of CABG (Father)    MEDICATIONS  (STANDING):  aspirin enteric coated 81 milliGRAM(s) Oral daily  atorvastatin 80 milliGRAM(s) Oral at bedtime  clopidogrel Tablet 75 milliGRAM(s) Oral daily  heparin  Infusion.  Unit(s)/Hr (10 mL/Hr) IV Continuous <Continuous>  lamoTRIgine 100 milliGRAM(s) Oral two times a day  metoprolol succinate ER 50 milliGRAM(s) Oral at bedtime  pantoprazole    Tablet 40 milliGRAM(s) Oral before breakfast  venlafaxine XR. 150 milliGRAM(s) Oral daily    MEDICATIONS  (PRN):  acetaminophen     Tablet .. 650 milliGRAM(s) Oral every 6 hours PRN Temp greater or equal to 38C (100.4F), Mild Pain (1 - 3)  ALPRAZolam 1 milliGRAM(s) Oral three times a day PRN Anxiety  aluminum hydroxide/magnesium hydroxide/simethicone Suspension 30 milliLiter(s) Oral every 4 hours PRN Dyspepsia  heparin   Injectable 6000 Unit(s) IV Push every 6 hours PRN For aPTT less than 40  melatonin 3 milliGRAM(s) Oral at bedtime PRN Insomnia  nitroglycerin     SubLingual 0.4 milliGRAM(s) SubLingual every 5 minutes PRN Chest Pain  ondansetron Injectable 4 milliGRAM(s) IV Push every 8 hours PRN Nausea and/or Vomiting      Vital Signs Last 24 Hrs  T(C): 37.1 (08 Dec 2024 00:45), Max: 37.1 (08 Dec 2024 00:45)  T(F): 98.7 (08 Dec 2024 00:45), Max: 98.7 (08 Dec 2024 00:45)  HR: 67 (08 Dec 2024 05:00) (67 - 101)  BP: 121/85 (08 Dec 2024 05:00) (109/76 - 185/75)  BP(mean): 95 (08 Dec 2024 05:00) (84 - 100)  RR: 19 (08 Dec 2024 05:00) (15 - 23)  SpO2: 93% (08 Dec 2024 01:00) (93% - 99%)    Parameters below as of 07 Dec 2024 23:40  Patient On (Oxygen Delivery Method): room air        REVIEW OF SYSTEMS:    CONSTITUTIONAL:  As per HPI.  HEENT:  Eyes:  No diplopia or blurred vision. ENT:  No earache, sore throat or runny nose.  CARDIOVASCULAR:  No pressure, squeezing, strangling, tightness, heaviness or aching about the chest, neck, axilla or epigastrium.  RESPIRATORY:  No cough, shortness of breath, PND or orthopnea.  GASTROINTESTINAL:  No nausea, vomiting or diarrhea.  GENITOURINARY:  No dysuria, frequency or urgency.  MUSCULOSKELETAL:  As per HPI.  SKIN:  No change in skin, hair or nails.  NEUROLOGIC:  No paresthesias, fasciculations, seizures or weakness.  PSYCHIATRIC:  No disorder of thought or mood.  ENDOCRINE:  No heat or cold intolerance, polyuria or polydipsia.  HEMATOLOGICAL:  No easy bruising or bleedings.     PHYSICAL EXAMINATION:    GENERAL APPEARANCE:  Pt. is not currently dyspneic, in no distress. Pt. is alert, oriented, and pleasant.  HEENT:  Pupils are normal and react normally. No icterus. Mucous membranes well colored.  NECK:  Supple. No lymphadenopathy. Jugular venous pressure not elevated. Carotids equal.   HEART:   The cardiac impulse has a normal quality. There are no murmurs, rubs or gallops noted  CHEST:  Chest is clear to auscultation. Normal respiratory effort.  ABDOMEN:  Soft and nontender.   EXTREMITIES:  There is no edema.   SKIN:  No rash or significant lesions are noted.    I&O's Summary    07 Dec 2024 07:01  -  08 Dec 2024 07:00  --------------------------------------------------------  IN: 0 mL / OUT: 100 mL / NET: -100 mL        LABS:                        14.1   7.24  )-----------( 173      ( 08 Dec 2024 04:06 )             40.6     12-08    143  |  111[H]  |  15  ----------------------------<  108[H]  3.6   |  26  |  0.70    Ca    8.8      08 Dec 2024 04:06  Phos  4.0     12-08  Mg     2.0     12-08    TPro  6.3  /  Alb  3.1[L]  /  TBili  0.4  /  DBili  x   /  AST  18  /  ALT  39  /  AlkPhos  85  12-08    LIVER FUNCTIONS - ( 08 Dec 2024 04:06 )  Alb: 3.1 g/dL / Pro: 6.3 gm/dL / ALK PHOS: 85 U/L / ALT: 39 U/L / AST: 18 U/L / GGT: x           PT/INR - ( 07 Dec 2024 20:40 )   PT: 11.3 sec;   INR: 0.98 ratio         PTT - ( 08 Dec 2024 04:06 )  PTT:45.4 sec      Urinalysis Basic - ( 08 Dec 2024 04:06 )    Color: x / Appearance: x / SG: x / pH: x  Gluc: 108 mg/dL / Ketone: x  / Bili: x / Urobili: x   Blood: x / Protein: x / Nitrite: x   Leuk Esterase: x / RBC: x / WBC x   Sq Epi: x / Non Sq Epi: x / Bacteria: x    EKG: < from: 12 Lead ECG (11.18.24 @ 17:24) >  Diagnosis Line NORMAL SINUS RHYTHM  NONSPECIFIC T WAVE ABNORMALITY  ABNORMAL ECG  WHEN COMPARED WITH ECG OF 18-NOV-2024 10:14,  NO SIGNIFICANT CHANGE WAS FOUND    TELEMETRY: SR    CARDIAC TESTS: < from: Cardiac Catheterization (11.18.24 @ 16:36) >  Conclusions:   Successful PCI with ALEX to the focal mid LAD restenosis and balloon  angioplasty of the distal LAD restenosis.  DAPT    < from: Cardiac Catheterization (11.18.24 @ 16:36) >  Interventional Findings:     Interventional Details   Mid left anterior descending: The initial stenosis was 90 %. Guidewire  crossing was successful.    A successful Balloon angioplasty was performed using a 6FR JL 4.0  LAUNCHER, a CLEOPATRA [id number], and a SAPPHIRE 2.0 X  20.      The inflation pressure was 18 jg for the duration of 9.0 seconds.     A successful Drug Eluting Stent was deployed using a 3.0 X 18 ORSIRO  MISSION.    The inflation pressure was 20 jg for the duration of 6.0 seconds.     A successful Balloon angioplasty was performed using a 3.25 X 15  EUPHORA NC.    The inflation pressure was 22 jg for the duration of 9.0 seconds.     The inflation pressure was 22 jg for the duration of 9.0 seconds.     < from: TTE Echo Complete w/o Contrast w/ Doppler (09.02.23 @ 07:42) >   The mitral valve leaflets appear thin and normal.   Trace mitral regurgitation is present.   The aortic valve is trileaflet with thin pliable leaflets.   The tricuspid valve leaflets are thin and pliable; valve motion is   normal.   Trace tricuspid valve regurgitation is present.   Normal appearing left atrium.   The left ventricle is normal in size, wall thickness, wall motion and   contractility.   Estimated left ventricular ejection fraction is 65-70 %.   Normal appearing right atrium.   Normal appearing right ventricle structure and function.    < from: TTE W or WO Ultrasound Enhancing Agent (12.08.24 @ 08:28) >   1. Left ventricular cavity is normal in size. Left ventricular wall thickness is normal. Left ventricular systolic function is normal with an ejection fraction visually estimated at 55 to 60 %.   2. There is mild (grade 1) left ventricular diastolic dysfunction.   3. Normal right ventricular cavity size and normal right ventricular systolic function.   4. Normal left andright atrial size.   5. Mild mitral regurgitation.   6. Mild tricuspid regurgitation.   7. Estimated pulmonary artery systolic pressure is 24 mmHg, consistent with normal pulmonary artery pressure.    < end of copied text >      RADIOLOGY & ADDITIONAL STUDIES: < from: Xray Chest 2 Views PA/Lat (12.07.24 @ 20:22) >  IMPRESSION:  No radiographic evidence of active chest disease..    ASSESSMENT & PLAN:

## 2024-12-09 NOTE — DISCHARGE NOTE NURSING/CASE MANAGEMENT/SOCIAL WORK - FINANCIAL ASSISTANCE
[facility] provides services at a reduced cost to those who are determined to be eligible through [facility]’s financial assistance program. Information regarding [facility]’s financial assistance program can be found by going to [email or web address] or by calling [phone].

## 2024-12-09 NOTE — ASU DISCHARGE PLAN (ADULT/PEDIATRIC) - CARE PROVIDER_API CALL
Demetrius Chavis  Cardiovascular Disease  [address] [place] [address]  Phone: [phone]  Fax: [phone]  Follow Up Time: 2 weeks

## 2024-12-09 NOTE — DISCHARGE NOTE NURSING/CASE MANAGEMENT/SOCIAL WORK - PATIENT PORTAL LINK FT
You can access the FollowMyHealth Patient Portal offered by [facility] by registering at the following website: [email or web address]. By joining [facility]’s FollowMyHealth portal, you will also be able to view your health information using other applications (apps) compatible with our system.

## 2024-12-09 NOTE — ASU DISCHARGE PLAN (ADULT/PEDIATRIC) - FINANCIAL ASSISTANCE
[facility] provides services at a reduced cost to those who are determined to be eligible through [facility]’s financial assistance program. Information regarding [facility]’s financial assistance program can be found by going to [email or web address] or by calling [phone].

## 2024-12-09 NOTE — DISCHARGE NOTE NURSING/CASE MANAGEMENT/SOCIAL WORK - NSDCVIVACCINE_GEN_ALL_CORE_FT
Tdap; 14-Apr-2024 15:53; Elissa Potter (RN); [employer]; [id number] (Exp. Date: 06-Dec-2024); IntraMuscular; Deltoid Left.; 0.5 milliLiter(s); VIS (VIS Published: 09-May-2013, VIS Presented: 14-Apr-2024);

## 2024-12-09 NOTE — H&P CARDIOLOGY - HISTORY OF PRESENT ILLNESS
64M w/ h/o STEMI at age 48, CAD s/p PCI with 13 stents (Last PCI 11/18/24, ALEX to mLAD), on Plavix and aspirin, GERD, high cholesterol, HTN, presents with complaints of exertional substernal chest pain associated with dyspnea for 1 day. Patient noticed transient chest pains today while climbing uphill which improved with rest, and also when climbing stairs. Has been compliant with his DAPT. No other acute complaints. Patient took 325 of aspirin prior to arrival.  Denies leg swelling, smoking or drug use. Denies other ROS. In ED vitals stable, chest pain free. CBC/CMP unremarkable. ECG noted for new ST-depressions in inferior leads. Patient started on hep gtt.   Troponins  negative x 2. Pt was transferred to [facility] for Cardiac cath.        TTE 12/8/24  CONCLUSIONS:   1. Left ventricular cavity is normal in size. Left ventricular wall thickness is normal. Left ventricular systolic function is normal with an ejection fraction visually estimated at 55 to 60 %.   2. There is mild (grade 1) left ventricular diastolic dysfunction.   3. Normal right ventricular cavity size and normal right ventricular systolic function.   4. Normal left and right atrial size.   5. Mild mitral regurgitation.   6. Mild tricuspid regurgitation.   7. Estimated pulmonary artery systolic pressure is 24 mmHg, consistent with normal pulmonary artery pressure.    Cards: Demetrius Chavis

## 2024-12-09 NOTE — DISCHARGE NOTE NURSING/CASE MANAGEMENT/SOCIAL WORK - FINANCIAL ASSISTANCE
[facility] provides services at a reduced cost to those who are determined to be eligible through [facility]’s financial assistance program. Information regarding [facility]’s financial assistance program can be found by going to [email or web address] or by calling [phone].

## 2025-01-31 DIAGNOSIS — E66.01 OBESITY, CLASS 2: ICD-10-CM

## 2025-01-31 DIAGNOSIS — E66.812 OBESITY, CLASS 2: ICD-10-CM

## 2025-01-31 DIAGNOSIS — I25.10 ATHEROSCLEROTIC HEART DISEASE OF NATIVE CORONARY ARTERY W/OUT ANGINA PECTORIS: ICD-10-CM

## 2025-01-31 RX ORDER — RANOLAZINE 1000 MG/1
1000 TABLET, EXTENDED RELEASE ORAL
Qty: 180 | Refills: 3 | Status: ACTIVE | COMMUNITY
Start: 2025-01-31 | End: 1900-01-01

## 2025-02-02 NOTE — ASU PATIENT PROFILE, ADULT - FALL HARM RISK - TYPE OF ASSESSMENT
Discharge instructions reviewed with patient. Discussed fetal kick counts, vaginal bleeding, and leaking of fluid. Patient to keep all scheduled appointments, to call MD or return to L&D if any additional problems.     Admission

## 2025-02-05 RX ORDER — SEMAGLUTIDE 0.25 MG/.5ML
0.25 INJECTION, SOLUTION SUBCUTANEOUS
Qty: 1 | Refills: 0 | Status: ACTIVE | COMMUNITY
Start: 2025-01-31 | End: 1900-01-01

## 2025-02-06 ENCOUNTER — OUTPATIENT (OUTPATIENT)
Dept: OUTPATIENT SERVICES | Facility: HOSPITAL | Age: 65
LOS: 1 days | End: 2025-02-06

## 2025-02-06 ENCOUNTER — NON-APPOINTMENT (OUTPATIENT)
Age: 65
End: 2025-02-06

## 2025-02-06 ENCOUNTER — APPOINTMENT (OUTPATIENT)
Dept: INTERNAL MEDICINE | Facility: CLINIC | Age: 65
End: 2025-02-06

## 2025-02-06 DIAGNOSIS — Z95.5 PRESENCE OF CORONARY ANGIOPLASTY IMPLANT AND GRAFT: Chronic | ICD-10-CM

## 2025-02-06 DIAGNOSIS — Z92.3 PERSONAL HISTORY OF IRRADIATION: Chronic | ICD-10-CM

## 2025-02-07 ENCOUNTER — TRANSCRIPTION ENCOUNTER (OUTPATIENT)
Age: 65
End: 2025-02-07

## 2025-02-10 ENCOUNTER — TRANSCRIPTION ENCOUNTER (OUTPATIENT)
Age: 65
End: 2025-02-10

## 2025-02-20 ENCOUNTER — TRANSCRIPTION ENCOUNTER (OUTPATIENT)
Age: 65
End: 2025-02-20

## 2025-02-20 ENCOUNTER — RX RENEWAL (OUTPATIENT)
Age: 65
End: 2025-02-20

## 2025-02-23 PROBLEM — J11.1 INFLUENZA: Status: ACTIVE | Noted: 2025-02-23

## 2025-02-24 ENCOUNTER — RX RENEWAL (OUTPATIENT)
Age: 65
End: 2025-02-24

## 2025-03-05 ENCOUNTER — APPOINTMENT (OUTPATIENT)
Dept: INTERNAL MEDICINE | Facility: CLINIC | Age: 65
End: 2025-03-05
Payer: COMMERCIAL

## 2025-03-05 VITALS
BODY MASS INDEX: 30.96 KG/M2 | TEMPERATURE: 97.8 F | SYSTOLIC BLOOD PRESSURE: 100 MMHG | HEIGHT: 75 IN | HEART RATE: 74 BPM | DIASTOLIC BLOOD PRESSURE: 70 MMHG | WEIGHT: 249 LBS | OXYGEN SATURATION: 95 %

## 2025-03-05 DIAGNOSIS — G47.33 OBSTRUCTIVE SLEEP APNEA (ADULT) (PEDIATRIC): ICD-10-CM

## 2025-03-05 DIAGNOSIS — R06.83 SNORING: ICD-10-CM

## 2025-03-05 DIAGNOSIS — G47.9 SLEEP DISORDER, UNSPECIFIED: ICD-10-CM

## 2025-03-05 PROCEDURE — 99203 OFFICE O/P NEW LOW 30 MIN: CPT

## 2025-03-09 NOTE — ASU DISCHARGE PLAN (ADULT/PEDIATRIC) - CALL YOUR DOCTOR IF YOU HAVE ANY OF THE FOLLOWING:
Problem: Adult Inpatient Plan of Care  Goal: Plan of Care Review  Outcome: Progressing  Goal: Patient-Specific Goal (Individualized)  Outcome: Progressing  Goal: Absence of Hospital-Acquired Illness or Injury  Outcome: Progressing  Goal: Optimal Comfort and Wellbeing  Outcome: Progressing  Goal: Readiness for Transition of Care  Outcome: Progressing     Problem: Skin Injury Risk Increased  Goal: Skin Health and Integrity  Outcome: Progressing     Problem: Infection  Goal: Absence of Infection Signs and Symptoms  Outcome: Progressing     Problem: Fall Injury Risk  Goal: Absence of Fall and Fall-Related Injury  Outcome: Progressing     Problem: Comorbidity Management  Goal: Maintenance of COPD Symptom Control  Outcome: Progressing  Goal: Blood Pressure in Desired Range  Outcome: Progressing     Problem: Hip Fracture Medical Management  Goal: Optimal Coping with Change in Health Status  Outcome: Progressing  Goal: Absence of Bleeding  Outcome: Progressing  Goal: Effective Bowel Elimination  Outcome: Progressing  Goal: Baseline Cognitive Function Maintained  Outcome: Progressing  Goal: Absence of Embolism  Outcome: Progressing  Goal: Fracture Stability  Outcome: Progressing  Goal: Optimal Functional Performance  Outcome: Progressing  Goal: Pain Control and Function  Outcome: Progressing  Goal: Effective Urinary Elimination  Outcome: Progressing      Bleeding that does not stop/Swelling that gets worse/Pain not relieved by Medications/Fever greater than (need to indicate Fahrenheit or Celsius)/Wound/Surgical Site with redness, or foul smelling discharge or pus/Numbness, tingling, color or temperature change to extremity

## 2025-04-07 ENCOUNTER — OUTPATIENT (OUTPATIENT)
Dept: OUTPATIENT SERVICES | Facility: HOSPITAL | Age: 65
LOS: 1 days | End: 2025-04-07
Payer: COMMERCIAL

## 2025-04-07 DIAGNOSIS — Z92.3 PERSONAL HISTORY OF IRRADIATION: Chronic | ICD-10-CM

## 2025-04-07 DIAGNOSIS — Z95.5 PRESENCE OF CORONARY ANGIOPLASTY IMPLANT AND GRAFT: Chronic | ICD-10-CM

## 2025-04-07 DIAGNOSIS — G47.33 OBSTRUCTIVE SLEEP APNEA (ADULT) (PEDIATRIC): ICD-10-CM

## 2025-04-07 PROCEDURE — 95800 SLP STDY UNATTENDED: CPT | Mod: 26

## 2025-04-07 PROCEDURE — 95800 SLP STDY UNATTENDED: CPT

## 2025-04-10 PROCEDURE — 95800 SLP STDY UNATTENDED: CPT | Mod: 26

## 2025-04-11 PROBLEM — G47.33 OBSTRUCTIVE SLEEP APNEA OF ADULT: Status: ACTIVE | Noted: 2025-04-11

## 2025-04-17 ENCOUNTER — TRANSCRIPTION ENCOUNTER (OUTPATIENT)
Age: 65
End: 2025-04-17

## 2025-04-25 ENCOUNTER — LABORATORY RESULT (OUTPATIENT)
Age: 65
End: 2025-04-25

## 2025-04-25 DIAGNOSIS — E78.5 HYPERLIPIDEMIA, UNSPECIFIED: ICD-10-CM

## 2025-04-25 DIAGNOSIS — F32.A ANXIETY DISORDER, UNSPECIFIED: ICD-10-CM

## 2025-04-25 DIAGNOSIS — F41.9 ANXIETY DISORDER, UNSPECIFIED: ICD-10-CM

## 2025-04-25 DIAGNOSIS — E55.9 VITAMIN D DEFICIENCY, UNSPECIFIED: ICD-10-CM

## 2025-04-28 DIAGNOSIS — E66.9 OBESITY, UNSPECIFIED: ICD-10-CM

## 2025-04-28 LAB
25(OH)D3 SERPL-MCNC: 11.8 NG/ML
ALBUMIN SERPL ELPH-MCNC: 4.3 G/DL
ALP BLD-CCNC: 90 U/L
ALT SERPL-CCNC: 24 U/L
ANION GAP SERPL CALC-SCNC: 15 MMOL/L
APPEARANCE: CLEAR
AST SERPL-CCNC: 17 U/L
B BURGDOR AB SER-IMP: NEGATIVE
B BURGDOR IGG+IGM SER QL: 0.54 INDEX
BASOPHILS # BLD AUTO: 0.03 K/UL
BASOPHILS NFR BLD AUTO: 0.4 %
BILIRUB SERPL-MCNC: 0.8 MG/DL
BILIRUBIN URINE: NEGATIVE
BLOOD URINE: NEGATIVE
BUN SERPL-MCNC: 15 MG/DL
CALCIUM SERPL-MCNC: 9.1 MG/DL
CHLORIDE SERPL-SCNC: 106 MMOL/L
CHOLEST SERPL-MCNC: 184 MG/DL
CO2 SERPL-SCNC: 21 MMOL/L
COLOR: NORMAL
CREAT SERPL-MCNC: 0.81 MG/DL
EGFRCR SERPLBLD CKD-EPI 2021: 98 ML/MIN/1.73M2
EOSINOPHIL # BLD AUTO: 0.17 K/UL
EOSINOPHIL NFR BLD AUTO: 2.2 %
ESTIMATED AVERAGE GLUCOSE: 105 MG/DL
FOLATE SERPL-MCNC: 8.9 NG/ML
GLUCOSE QUALITATIVE U: NEGATIVE MG/DL
GLUCOSE SERPL-MCNC: 99 MG/DL
HBA1C MFR BLD HPLC: 5.3 %
HCT VFR BLD CALC: 47.8 %
HCV AB SER QL: NONREACTIVE
HCV S/CO RATIO: 0.1 S/CO
HDLC SERPL-MCNC: 39 MG/DL
HGB BLD-MCNC: 16.1 G/DL
IMM GRANULOCYTES NFR BLD AUTO: 0.3 %
IRON SATN MFR SERPL: 55 %
IRON SERPL-MCNC: 182 UG/DL
KETONES URINE: ABNORMAL MG/DL
LDLC SERPL-MCNC: 83 MG/DL
LEUKOCYTE ESTERASE URINE: ABNORMAL
LYMPHOCYTES # BLD AUTO: 1.39 K/UL
LYMPHOCYTES NFR BLD AUTO: 18.3 %
MAN DIFF?: NORMAL
MCHC RBC-ENTMCNC: 32.7 PG
MCHC RBC-ENTMCNC: 33.7 G/DL
MCV RBC AUTO: 97 FL
MONOCYTES # BLD AUTO: 0.47 K/UL
MONOCYTES NFR BLD AUTO: 6.2 %
NEUTROPHILS # BLD AUTO: 5.5 K/UL
NEUTROPHILS NFR BLD AUTO: 72.6 %
NITRITE URINE: NEGATIVE
NONHDLC SERPL-MCNC: 145 MG/DL
PH URINE: 5.5
PLATELET # BLD AUTO: 191 K/UL
POTASSIUM SERPL-SCNC: 4.1 MMOL/L
PROT SERPL-MCNC: 6.9 G/DL
PROTEIN URINE: NORMAL MG/DL
PSA SERPL-MCNC: 1.08 NG/ML
RBC # BLD: 4.93 M/UL
RBC # FLD: 13.1 %
SODIUM SERPL-SCNC: 142 MMOL/L
SPECIFIC GRAVITY URINE: 1.03
T3FREE SERPL-MCNC: 2.88 PG/ML
T4 FREE SERPL-MCNC: 0.9 NG/DL
TIBC SERPL-MCNC: 333 UG/DL
TRIGL SERPL-MCNC: 385 MG/DL
TSH SERPL-ACNC: 2.71 UIU/ML
UIBC SERPL-MCNC: 151 UG/DL
URATE SERPL-MCNC: 5 MG/DL
UROBILINOGEN URINE: 1 MG/DL
VIT B12 SERPL-MCNC: 336 PG/ML
WBC # FLD AUTO: 7.58 K/UL

## 2025-04-29 LAB
TESTOST FREE SERPL-MCNC: 10.1 PG/ML
TESTOST SERPL-MCNC: 508 NG/DL

## 2025-05-08 ENCOUNTER — APPOINTMENT (OUTPATIENT)
Dept: INTERNAL MEDICINE | Facility: CLINIC | Age: 65
End: 2025-05-08

## 2025-05-08 ENCOUNTER — OUTPATIENT (OUTPATIENT)
Dept: OUTPATIENT SERVICES | Facility: HOSPITAL | Age: 65
LOS: 1 days | End: 2025-05-08

## 2025-05-08 DIAGNOSIS — Z92.3 PERSONAL HISTORY OF IRRADIATION: Chronic | ICD-10-CM

## 2025-05-08 DIAGNOSIS — Z95.5 PRESENCE OF CORONARY ANGIOPLASTY IMPLANT AND GRAFT: Chronic | ICD-10-CM

## 2025-05-09 RX ORDER — TIRZEPATIDE 2.5 MG/.5ML
2.5 INJECTION, SOLUTION SUBCUTANEOUS
Qty: 4 | Refills: 0 | Status: ACTIVE | COMMUNITY
Start: 2025-04-28 | End: 1900-01-01

## 2025-06-02 ENCOUNTER — NON-APPOINTMENT (OUTPATIENT)
Age: 65
End: 2025-06-02

## 2025-06-13 RX ORDER — TAMSULOSIN HYDROCHLORIDE 0.4 MG/1
0.4 CAPSULE ORAL
Qty: 180 | Refills: 0 | Status: ACTIVE | COMMUNITY
Start: 2025-06-13 | End: 1900-01-01

## 2025-06-13 RX ORDER — FINASTERIDE 5 MG/1
5 TABLET, FILM COATED ORAL DAILY
Qty: 90 | Refills: 1 | Status: ACTIVE | COMMUNITY
Start: 2025-06-13 | End: 1900-01-01

## 2025-08-05 RX ORDER — TIRZEPATIDE 10 MG/.5ML
10 INJECTION, SOLUTION SUBCUTANEOUS
Qty: 1 | Refills: 0 | Status: ACTIVE | COMMUNITY
Start: 2025-08-05 | End: 1900-01-01